# Patient Record
Sex: MALE | Race: BLACK OR AFRICAN AMERICAN | NOT HISPANIC OR LATINO | Employment: FULL TIME | URBAN - METROPOLITAN AREA
[De-identification: names, ages, dates, MRNs, and addresses within clinical notes are randomized per-mention and may not be internally consistent; named-entity substitution may affect disease eponyms.]

---

## 2019-09-23 ENCOUNTER — TRANSCRIBE ORDERS (OUTPATIENT)
Dept: ADMINISTRATIVE | Facility: HOSPITAL | Age: 49
End: 2019-09-23

## 2019-09-23 DIAGNOSIS — D86.9 SARCOIDOSIS: Primary | ICD-10-CM

## 2019-10-17 ENCOUNTER — HOSPITAL ENCOUNTER (OUTPATIENT)
Dept: CT IMAGING | Facility: HOSPITAL | Age: 49
Discharge: HOME | End: 2019-10-17
Admitting: INTERNAL MEDICINE

## 2019-10-17 DIAGNOSIS — D86.9 SARCOIDOSIS: ICD-10-CM

## 2019-10-17 PROCEDURE — 71250 CT THORAX DX C-: CPT

## 2020-02-25 ENCOUNTER — OFFICE VISIT (OUTPATIENT)
Dept: GASTROENTEROLOGY | Facility: CLINIC | Age: 50
End: 2020-02-25

## 2020-02-25 VITALS
DIASTOLIC BLOOD PRESSURE: 82 MMHG | SYSTOLIC BLOOD PRESSURE: 134 MMHG | HEIGHT: 70 IN | BODY MASS INDEX: 34.04 KG/M2 | TEMPERATURE: 98.4 F | WEIGHT: 237.8 LBS

## 2020-02-25 DIAGNOSIS — R11.2 NAUSEA AND VOMITING, INTRACTABILITY OF VOMITING NOT SPECIFIED, UNSPECIFIED VOMITING TYPE: Primary | ICD-10-CM

## 2020-02-25 DIAGNOSIS — Z12.11 ENCOUNTER FOR SCREENING FOR MALIGNANT NEOPLASM OF COLON: ICD-10-CM

## 2020-02-25 DIAGNOSIS — R10.13 DYSPEPSIA: ICD-10-CM

## 2020-02-25 LAB
ALBUMIN SERPL-MCNC: 3.9 G/DL (ref 3.5–5.2)
ALBUMIN/GLOB SERPL: 1.2 G/DL
ALP SERPL-CCNC: 55 U/L (ref 39–117)
ALT SERPL-CCNC: 29 U/L (ref 1–41)
AST SERPL-CCNC: 20 U/L (ref 1–40)
BASOPHILS # BLD AUTO: 0.04 10*3/MM3 (ref 0–0.2)
BASOPHILS NFR BLD AUTO: 0.7 % (ref 0–1.5)
BILIRUB SERPL-MCNC: 0.4 MG/DL (ref 0.2–1.2)
BUN SERPL-MCNC: 14 MG/DL (ref 6–20)
BUN/CREAT SERPL: 12.4 (ref 7–25)
CALCIUM SERPL-MCNC: 8.9 MG/DL (ref 8.6–10.5)
CHLORIDE SERPL-SCNC: 105 MMOL/L (ref 98–107)
CO2 SERPL-SCNC: 25.6 MMOL/L (ref 22–29)
CREAT SERPL-MCNC: 1.13 MG/DL (ref 0.76–1.27)
EOSINOPHIL # BLD AUTO: 0.08 10*3/MM3 (ref 0–0.4)
EOSINOPHIL NFR BLD AUTO: 1.5 % (ref 0.3–6.2)
ERYTHROCYTE [DISTWIDTH] IN BLOOD BY AUTOMATED COUNT: 13.8 % (ref 12.3–15.4)
GLOBULIN SER CALC-MCNC: 3.2 GM/DL
GLUCOSE SERPL-MCNC: 105 MG/DL (ref 65–99)
HCT VFR BLD AUTO: 45.3 % (ref 37.5–51)
HGB BLD-MCNC: 15.4 G/DL (ref 13–17.7)
IMM GRANULOCYTES # BLD AUTO: 0.01 10*3/MM3 (ref 0–0.05)
IMM GRANULOCYTES NFR BLD AUTO: 0.2 % (ref 0–0.5)
LYMPHOCYTES # BLD AUTO: 2.2 10*3/MM3 (ref 0.7–3.1)
LYMPHOCYTES NFR BLD AUTO: 40.9 % (ref 19.6–45.3)
MCH RBC QN AUTO: 30.1 PG (ref 26.6–33)
MCHC RBC AUTO-ENTMCNC: 34 G/DL (ref 31.5–35.7)
MCV RBC AUTO: 88.5 FL (ref 79–97)
MONOCYTES # BLD AUTO: 0.44 10*3/MM3 (ref 0.1–0.9)
MONOCYTES NFR BLD AUTO: 8.2 % (ref 5–12)
NEUTROPHILS # BLD AUTO: 2.61 10*3/MM3 (ref 1.7–7)
NEUTROPHILS NFR BLD AUTO: 48.5 % (ref 42.7–76)
NRBC BLD AUTO-RTO: 0 /100 WBC (ref 0–0.2)
PLATELET # BLD AUTO: 250 10*3/MM3 (ref 140–450)
POTASSIUM SERPL-SCNC: 3.9 MMOL/L (ref 3.5–5.2)
PROT SERPL-MCNC: 7.1 G/DL (ref 6–8.5)
RBC # BLD AUTO: 5.12 10*6/MM3 (ref 4.14–5.8)
SODIUM SERPL-SCNC: 143 MMOL/L (ref 136–145)
WBC # BLD AUTO: 5.38 10*3/MM3 (ref 3.4–10.8)

## 2020-02-25 PROCEDURE — 99204 OFFICE O/P NEW MOD 45 MIN: CPT | Performed by: NURSE PRACTITIONER

## 2020-02-25 RX ORDER — HYDROCODONE BITARTRATE AND ACETAMINOPHEN 5; 325 MG/1; MG/1
1 TABLET ORAL
COMMUNITY
Start: 2016-05-31 | End: 2021-09-21

## 2020-02-25 RX ORDER — NAPROXEN 375 MG/1
TABLET ORAL
COMMUNITY
End: 2021-09-21

## 2020-02-25 RX ORDER — ROSUVASTATIN CALCIUM 40 MG/1
TABLET, COATED ORAL DAILY
COMMUNITY
End: 2021-09-21

## 2020-02-25 RX ORDER — AMLODIPINE BESYLATE 10 MG/1
TABLET ORAL
COMMUNITY
Start: 2019-12-30 | End: 2021-09-21

## 2020-02-25 RX ORDER — PANTOPRAZOLE SODIUM 40 MG/1
40 TABLET, DELAYED RELEASE ORAL 2 TIMES DAILY
Qty: 180 TABLET | Refills: 3 | Status: SHIPPED | OUTPATIENT
Start: 2020-02-25 | End: 2021-04-09

## 2020-02-25 NOTE — PROGRESS NOTES
Chief Complaint   Patient presents with   • Nausea and vomiting   • Dyspepsia     HPI    Chava Gomez is a  49 y.o. male here to establish care as a new patient for complaints of nausea, vomiting, and dyspepsia.  Patient will also follow with Dr. Kirk.  Patient reports ongoing nausea and vomiting for approximately 10 years.  He was diagnosed with sarcoidosis with onset of symptoms.  He reports having an EGD at that time but cannot remember where this was performed or procedural findings.  He has been on omeprazole 20 mg once daily for approximately 1 year (only slight improvement in symptoms with PPI therapy).  Episodes of vomiting occur 3 days out of the week no enticing event.  Frequent dyspepsia symptoms and nausea.  He denies abdominal pain or dysphagia.    Bowels are moving daily with soft stools.  No diarrhea, constipation, or rectal bleeding.  Patient has never had a colonoscopy.    He does not smoke.  He rarely drinks alcohol usually with football games.  He rarely uses NSAIDs.  He still has his gallbladder.  He has never had a gastric emptying study.    Past Medical History:   Diagnosis Date   • GERD (gastroesophageal reflux disease)    • Hyperlipidemia    • Hypertension        History reviewed. No pertinent surgical history.    Scheduled Meds:  Outpatient Encounter Medications as of 2/25/2020   Medication Sig Dispense Refill   • amLODIPine (NORVASC) 10 MG tablet      • rosuvastatin (CRESTOR) 40 MG tablet Take  by mouth Daily.     • [DISCONTINUED] Omeprazole 20 MG Tablet Delayed Release Dispersible Take  by mouth Daily.     • cyclobenzaprine (FLEXERIL) 10 MG tablet Take 1 tablet by mouth 3 (Three) Times a Day As Needed for Muscle Spasms. 42 tablet 0   • HYDROcodone-acetaminophen (NORCO) 5-325 MG per tablet Take 1 tablet by mouth.     • naproxen (NAPROSYN) 375 MG tablet Take  by mouth.     • pantoprazole (PROTONIX) 40 MG EC tablet Take 1 tablet by mouth 2 (Two) Times a Day. 180 tablet 3     No  facility-administered encounter medications on file as of 2/25/2020.        Continuous Infusions:  No current facility-administered medications for this visit.     PRN Meds:.    No Known Allergies    Social History     Socioeconomic History   • Marital status:      Spouse name: Not on file   • Number of children: Not on file   • Years of education: Not on file   • Highest education level: Not on file   Tobacco Use   • Smoking status: Never Smoker   • Smokeless tobacco: Never Used   Substance and Sexual Activity   • Alcohol use: Yes     Comment: social   • Drug use: Not Currently       History reviewed. No pertinent family history.    Review of Systems   Constitutional: Negative for activity change, appetite change, fatigue, fever and unexpected weight change.   HENT: Negative for trouble swallowing and voice change.    Eyes: Negative for discharge and itching.   Respiratory: Negative for apnea, cough, choking, chest tightness, shortness of breath and wheezing.    Cardiovascular: Negative for chest pain, palpitations and leg swelling.   Gastrointestinal: Positive for nausea and vomiting. Negative for abdominal distention, abdominal pain, anal bleeding, blood in stool, constipation, diarrhea and rectal pain.   Endocrine: Negative for cold intolerance and heat intolerance.   Genitourinary: Negative for difficulty urinating and flank pain.   Musculoskeletal: Negative for back pain and neck pain.   Skin: Negative for color change and pallor.   Allergic/Immunologic: Negative for environmental allergies and food allergies.   Neurological: Negative for dizziness and headaches.   Hematological: Negative for adenopathy.   Psychiatric/Behavioral: Negative for agitation and confusion.       Vitals:    02/25/20 0921   BP: 134/82   Temp: 98.4 °F (36.9 °C)       Physical Exam   Constitutional: He is oriented to person, place, and time. He appears well-developed and well-nourished.   HENT:   Head: Normocephalic.   Eyes:  Pupils are equal, round, and reactive to light.   Neck: Normal range of motion.   Cardiovascular: Normal rate, regular rhythm and normal heart sounds.   Pulmonary/Chest: Effort normal and breath sounds normal. No respiratory distress. He has no wheezes.   Abdominal: Soft. Bowel sounds are normal. He exhibits no distension and no mass. There is no tenderness. There is no guarding. No hernia.   Musculoskeletal: Normal range of motion.   Neurological: He is alert and oriented to person, place, and time.   Skin: Skin is warm and dry. Capillary refill takes less than 2 seconds.   Psychiatric: He has a normal mood and affect. His behavior is normal.     No radiology results for the last 7 days    Chava was seen today for nausea and vomiting and dyspepsia.    Diagnoses and all orders for this visit:    Nausea and vomiting, intractability of vomiting not specified, unspecified vomiting type  -     CBC & Differential  -     Comprehensive Metabolic Panel  -     Case Request; Standing  -     Case Request    Dyspepsia  -     CBC & Differential  -     Comprehensive Metabolic Panel  -     Case Request; Standing  -     Case Request    Encounter for screening for malignant neoplasm of colon  -     Case Request; Standing  -     Case Request    Other orders  -     pantoprazole (PROTONIX) 40 MG EC tablet; Take 1 tablet by mouth 2 (Two) Times a Day.    Impression:    Pleasant 49-year-old male seen today to establish care as a new patient for complaints of nausea, vomiting, dyspepsia ongoing for the past decade.  Based on the patient's age he is also due for colon cancer screening.  Moving forward recommend bidirectional endoscopic evaluation with Dr. Kirk for further evaluation of the patient's symptoms.  In the meanwhile stop omeprazole 20 mg once daily and start pantoprazole 40 mg twice daily.  Reinforced the need for antireflux diet, avoidance of alcohol, and avoidance of NSAIDs.      Labs today with CBC and CMP.    Consider  gastric emptying study if EGD found to be unremarkable.    Further recommendations and follow-up pending the aforementioned work-up.

## 2020-03-04 ENCOUNTER — TELEPHONE (OUTPATIENT)
Dept: GASTROENTEROLOGY | Facility: CLINIC | Age: 50
End: 2020-03-04

## 2020-03-04 ENCOUNTER — OUTSIDE FACILITY SERVICE (OUTPATIENT)
Dept: GASTROENTEROLOGY | Facility: CLINIC | Age: 50
End: 2020-03-04

## 2020-03-04 PROCEDURE — 45380 COLONOSCOPY AND BIOPSY: CPT | Performed by: INTERNAL MEDICINE

## 2020-03-04 PROCEDURE — 43239 EGD BIOPSY SINGLE/MULTIPLE: CPT | Performed by: INTERNAL MEDICINE

## 2020-03-04 RX ORDER — PANTOPRAZOLE SODIUM 40 MG/1
40 TABLET, DELAYED RELEASE ORAL 2 TIMES DAILY
Qty: 60 TABLET | Refills: 3 | Status: SHIPPED | OUTPATIENT
Start: 2020-03-04 | End: 2021-04-09

## 2020-03-04 NOTE — TELEPHONE ENCOUNTER
----- Message from Parker Kirk MD sent at 3/4/2020  1:01 PM EST -----  Regarding: ppi  Call in protonix 40mg po BID #[id number] [id number]

## 2020-03-10 NOTE — PROGRESS NOTES
Sarkar's esophagus no dysplasia  Benign polyp of colon  Colonoscopy recall 5 years  EGD recall 1 year  Continue twice daily PPI  Office visit Ana Cristina 6 weeks

## 2020-03-17 ENCOUNTER — TELEPHONE (OUTPATIENT)
Dept: GASTROENTEROLOGY | Facility: CLINIC | Age: 50
End: 2020-03-17

## 2020-03-17 NOTE — TELEPHONE ENCOUNTER
----- Message from AMANDA Avelar sent at 2/26/2020  9:12 AM EST -----  Please notify the patient that his lab work is normal.  No anemia, normal kidney and liver function.

## 2020-03-25 ENCOUNTER — TELEPHONE (OUTPATIENT)
Dept: GASTROENTEROLOGY | Facility: CLINIC | Age: 50
End: 2020-03-25

## 2020-03-25 NOTE — TELEPHONE ENCOUNTER
----- Message from Parker Kirk MD sent at 3/10/2020  3:40 PM EDT -----  Sarkar's esophagus no dysplasia  Benign polyp of colon  Colonoscopy recall 5 years  EGD recall 1 year  Continue twice daily PPI  Office visit Ana Cristina 6 weeks

## 2021-04-02 RX ORDER — NAPROXEN 375 MG/1
TABLET ORAL
Qty: 42 TABLET | Refills: 4 | OUTPATIENT
Start: 2021-04-02

## 2021-04-02 RX ORDER — AMLODIPINE BESYLATE 10 MG/1
TABLET ORAL
Qty: 30 TABLET | Refills: 4 | OUTPATIENT
Start: 2021-04-02

## 2021-04-08 RX ORDER — AMLODIPINE BESYLATE 10 MG/1
TABLET ORAL
Qty: 30 TABLET | Refills: 4 | OUTPATIENT
Start: 2021-04-08

## 2021-04-09 RX ORDER — PANTOPRAZOLE SODIUM 40 MG/1
TABLET, DELAYED RELEASE ORAL
Qty: 180 TABLET | Refills: 0 | Status: SHIPPED | OUTPATIENT
Start: 2021-04-09 | End: 2021-05-17

## 2021-04-12 RX ORDER — OMEPRAZOLE 20 MG/1
CAPSULE, DELAYED RELEASE ORAL
Qty: 30 CAPSULE | Refills: 1 | OUTPATIENT
Start: 2021-04-12

## 2021-05-17 ENCOUNTER — OFFICE VISIT (OUTPATIENT)
Dept: GASTROENTEROLOGY | Facility: CLINIC | Age: 51
End: 2021-05-17

## 2021-05-17 VITALS — BODY MASS INDEX: 35.73 KG/M2 | HEIGHT: 70 IN | WEIGHT: 249.6 LBS

## 2021-05-17 DIAGNOSIS — K22.70 BARRETT'S ESOPHAGUS WITHOUT DYSPLASIA: ICD-10-CM

## 2021-05-17 DIAGNOSIS — R10.13 DYSPEPSIA: ICD-10-CM

## 2021-05-17 DIAGNOSIS — R11.2 NAUSEA AND VOMITING, INTRACTABILITY OF VOMITING NOT SPECIFIED, UNSPECIFIED VOMITING TYPE: Primary | ICD-10-CM

## 2021-05-17 PROCEDURE — 99214 OFFICE O/P EST MOD 30 MIN: CPT | Performed by: NURSE PRACTITIONER

## 2021-05-17 RX ORDER — LANSOPRAZOLE 30 MG/1
30 CAPSULE, DELAYED RELEASE ORAL 2 TIMES DAILY
Qty: 60 CAPSULE | Refills: 5 | Status: SHIPPED | OUTPATIENT
Start: 2021-05-17 | End: 2021-09-21

## 2021-05-17 RX ORDER — SUCRALFATE 1 G/1
1 TABLET ORAL 2 TIMES DAILY
Qty: 60 TABLET | Refills: 3 | Status: SHIPPED | OUTPATIENT
Start: 2021-05-17 | End: 2021-09-21

## 2021-05-17 NOTE — PROGRESS NOTES
Chief Complaint   Patient presents with   • Nausea   • Vomiting   • Heartburn     HPI    Chava Gomez is a  50 y.o. male here for a follow up visit for GERD.  This patient follows with myself and Dr. Kirk. EGD and Cscope 3/2020: Hiatal hernia, esophagus without dysplasia, gastric ulcers, diverticulosis and polyp.  Recall EGD 1 year and recall colonoscopy 5 years.    Today Chava has been struggling with dyspepsia, nausea, and vomiting for at least the past 6 months.  He delayed follow-up due to the onset of the pandemic.  He denies esophageal dysphagia, odynophagia, or early satiety.  Vomiting mainly occurs shortly after eating approximately 3 times a week.  He is currently on Protonix twice daily as instructed following EGD.    No diarrhea, constipation, or rectal bleeding.    Past Medical History:   Diagnosis Date   • GERD (gastroesophageal reflux disease)    • Hyperlipidemia    • Hypertension        History reviewed. No pertinent surgical history.    Scheduled Meds:  Outpatient Encounter Medications as of 5/17/2021   Medication Sig Dispense Refill   • amLODIPine (NORVASC) 10 MG tablet      • naproxen (NAPROSYN) 375 MG tablet Take  by mouth.     • rosuvastatin (CRESTOR) 40 MG tablet Take  by mouth Daily.     • [DISCONTINUED] pantoprazole (PROTONIX) 40 MG EC tablet TAKE 1 TABLET BY MOUTH TWICE DAILY 180 tablet 0   • cyclobenzaprine (FLEXERIL) 10 MG tablet Take 1 tablet by mouth 3 (Three) Times a Day As Needed for Muscle Spasms. 42 tablet 0   • HYDROcodone-acetaminophen (NORCO) 5-325 MG per tablet Take 1 tablet by mouth.     • lansoprazole (PREVACID) 30 MG capsule Take 1 capsule by mouth 2 (Two) Times a Day. 60 capsule 5   • sucralfate (Carafate) 1 g tablet Take 1 tablet by mouth 2 (two) times a day. 60 tablet 3     No facility-administered encounter medications on file as of 5/17/2021.       Continuous Infusions:No current facility-administered medications for this visit.      PRN Meds:.    No Known  Allergies    Social History     Socioeconomic History   • Marital status:      Spouse name: Not on file   • Number of children: Not on file   • Years of education: Not on file   • Highest education level: Not on file   Tobacco Use   • Smoking status: Never Smoker   • Smokeless tobacco: Never Used   Substance and Sexual Activity   • Alcohol use: Yes     Comment: social   • Drug use: Not Currently       History reviewed. No pertinent family history.    Review of Systems   Constitutional: Negative for activity change, appetite change, fatigue, fever and unexpected weight change.   HENT: Negative for trouble swallowing.    Respiratory: Negative for apnea, cough, choking, chest tightness, shortness of breath and wheezing.    Cardiovascular: Negative for chest pain, palpitations and leg swelling.   Gastrointestinal: Positive for nausea and vomiting. Negative for abdominal distention, abdominal pain, anal bleeding, blood in stool, constipation, diarrhea and rectal pain.       There were no vitals filed for this visit.    Physical Exam  Constitutional:       Appearance: He is well-developed.   Abdominal:      General: Bowel sounds are normal. There is no distension.      Palpations: Abdomen is soft. There is no mass.      Tenderness: There is no abdominal tenderness. There is no guarding.      Hernia: No hernia is present.   Musculoskeletal:         General: Normal range of motion.   Skin:     General: Skin is warm and dry.      Capillary Refill: Capillary refill takes less than 2 seconds.   Neurological:      Mental Status: He is alert and oriented to person, place, and time.   Psychiatric:         Behavior: Behavior normal.       No radiology results for the last 7 days    Diagnoses and all orders for this visit:    1. Nausea and vomiting, intractability of vomiting not specified, unspecified vomiting type (Primary)  -     Case Request; Standing  -     Obtain Informed Consent; Standing  -     Case Request    2.  Dyspepsia  -     Case Request; Standing  -     Obtain Informed Consent; Standing  -     Case Request    3. Sarkar's esophagus without dysplasia  -     Case Request; Standing  -     Obtain Informed Consent; Standing  -     Case Request    Other orders  -     lansoprazole (PREVACID) 30 MG capsule; Take 1 capsule by mouth 2 (Two) Times a Day.  Dispense: 60 capsule; Refill: 5  -     sucralfate (Carafate) 1 g tablet; Take 1 tablet by mouth 2 (two) times a day.  Dispense: 60 tablet; Refill: 3    Assessment/plan    Pleasant 50-year-old male seen today in follow-up with symptoms of dyspepsia, nausea and vomiting found to have Sarkar's esophagus last year on EGD.  At this point recommend stopping Protonix and starting Prevacid at twice daily dosing.  He has been on Protonix for a number of years.  Start Carafate in combination with PPI therapy.  Antireflux dietary precautions reinforced.  Educational handout provided to the patient.  Arrange EGD with Dr. Kirk as well to assess response to therapy and mucosal healing.  I counseled the patient on the need to avoid NSAIDs given his history of gastric ulcer.    Colonoscopy for surveillance purposes 2025.    Chava can follow-up back in the office after the aforementioned work-up has been completed.

## 2021-08-12 ENCOUNTER — PREP FOR SURGERY (OUTPATIENT)
Dept: OTHER | Facility: HOSPITAL | Age: 51
End: 2021-08-12

## 2021-08-12 ENCOUNTER — OUTSIDE FACILITY SERVICE (OUTPATIENT)
Dept: GASTROENTEROLOGY | Facility: CLINIC | Age: 51
End: 2021-08-12

## 2021-08-12 DIAGNOSIS — R13.19 OTHER DYSPHAGIA: Primary | ICD-10-CM

## 2021-08-12 PROCEDURE — 43239 EGD BIOPSY SINGLE/MULTIPLE: CPT | Performed by: INTERNAL MEDICINE

## 2021-08-20 ENCOUNTER — TELEPHONE (OUTPATIENT)
Dept: GASTROENTEROLOGY | Facility: CLINIC | Age: 51
End: 2021-08-20

## 2021-08-20 PROBLEM — R13.19 OTHER DYSPHAGIA: Status: ACTIVE | Noted: 2021-08-20

## 2021-08-24 ENCOUNTER — TRANSCRIBE ORDERS (OUTPATIENT)
Dept: SLEEP MEDICINE | Facility: HOSPITAL | Age: 51
End: 2021-08-24

## 2021-08-24 DIAGNOSIS — Z01.818 OTHER SPECIFIED PRE-OPERATIVE EXAMINATION: Primary | ICD-10-CM

## 2021-08-25 ENCOUNTER — TELEPHONE (OUTPATIENT)
Dept: GASTROENTEROLOGY | Facility: CLINIC | Age: 51
End: 2021-08-25

## 2021-08-25 NOTE — TELEPHONE ENCOUNTER
----- Message from Parker Kirk MD sent at 8/20/2021 11:24 AM EDT -----  Sarkar's esophagusEGD recall 5 yearsPlease schedule esophageal manometry

## 2021-08-25 NOTE — TELEPHONE ENCOUNTER
Called pt and advised of Dr. Kirk's note.  Pt verbalized understanding.     EGD placed in recall for 8/12/2026.    Manometry on 8/30/21.

## 2021-08-27 ENCOUNTER — LAB (OUTPATIENT)
Dept: LAB | Facility: HOSPITAL | Age: 51
End: 2021-08-27

## 2021-08-27 DIAGNOSIS — Z01.818 OTHER SPECIFIED PRE-OPERATIVE EXAMINATION: ICD-10-CM

## 2021-08-27 LAB — SARS-COV-2 ORF1AB RESP QL NAA+PROBE: NOT DETECTED

## 2021-08-27 PROCEDURE — C9803 HOPD COVID-19 SPEC COLLECT: HCPCS

## 2021-08-27 PROCEDURE — U0004 COV-19 TEST NON-CDC HGH THRU: HCPCS

## 2021-08-30 ENCOUNTER — HOSPITAL ENCOUNTER (OUTPATIENT)
Facility: HOSPITAL | Age: 51
Setting detail: HOSPITAL OUTPATIENT SURGERY
Discharge: HOME | End: 2021-08-30
Attending: INTERNAL MEDICINE | Admitting: INTERNAL MEDICINE

## 2021-08-30 VITALS
WEIGHT: 239 LBS | HEART RATE: 82 BPM | DIASTOLIC BLOOD PRESSURE: 99 MMHG | RESPIRATION RATE: 16 BRPM | BODY MASS INDEX: 34.22 KG/M2 | SYSTOLIC BLOOD PRESSURE: 144 MMHG | HEIGHT: 70 IN | TEMPERATURE: 98.1 F | OXYGEN SATURATION: 97 %

## 2021-08-30 DIAGNOSIS — R13.19 OTHER DYSPHAGIA: ICD-10-CM

## 2021-08-30 PROCEDURE — S0260 H&P FOR SURGERY: HCPCS | Performed by: INTERNAL MEDICINE

## 2021-08-30 PROCEDURE — 91010 ESOPHAGUS MOTILITY STUDY: CPT

## 2021-08-30 PROCEDURE — 91010 ESOPHAGUS MOTILITY STUDY: CPT | Performed by: INTERNAL MEDICINE

## 2021-09-07 NOTE — PROCEDURES
Procedural Note    Patient Name:  Chava Gomez  YOB: 1970  Medical Records Number:  [id number]    Date of Procedure:  8/30/2021  Procedure Performed: High resolution esophageal motility study    Indication for procedure: GERD, abnormal endoscopy findings  Date of procedure: 8/30/2021  This patient underwent a standard esophageal motility study with the following findings:  LES residual pressure: 8.4 mmHg (less than 15)  LES relaxation percentage: 51% (Greater than 40%)  Esophageal body:  Wave amplitude: 37.3 mmHg ()  Wave duration: 3.8 seconds (2.8-4.1)  Wave velocity: 2.3 cm/s (2.8-6.3)  UES pressure: 2.5 mmHg (less than 12)  Percent failed peristalsis: 30% (0)  Percent peristalsis: 70%  Percent simultaneous contractions 0%     Impression:  Evidence of diminished amplitude and velocity of the esophageal body but not aperistaltic.  Normal LES relaxation and residual pressure with normal UES pressure.  Most consistent with some degree of esophageal dysmotility but not consistent with achalasia.      Pete Martel MD  9/7/2021  17:10 EDT   Spontaneous, unlabored and symmetrical

## 2021-09-07 NOTE — PROCEDURES
Procedural Note    Patient Name:  Chava Gomez  YOB: 1970  Medical Records Number:  [id number]    Date of Procedure:  8/30/2021  Procedure Performed: High resolution esophageal motility study    Indication for procedure: GERD, abnormal endoscopy findings  Date of procedure: 8/30/2021  This patient underwent a standard esophageal motility study with the following findings:  LES residual pressure: 8.4 mmHg (less than 15)  LES relaxation percentage: 51% (Greater than 40%)  Esophageal body:  Wave amplitude: 37.3 mmHg ()  Wave duration: 3.8 seconds (2.8-4.1)  Wave velocity: 2.3 cm/s (2.8-6.3)  UES pressure: 2.5 mmHg (less than 12)  Percent failed peristalsis: 30% (0)  Percent peristalsis: 70%  Percent simultaneous contractions 0%     Impression:  Evidence of diminished amplitude and velocity of the esophageal body but not aperistaltic.  Normal LES relaxation and residual pressure with normal UES pressure.  Most consistent with some degree of esophageal dysmotility but not consistent with achalasia.      Pete Martel MD  9/7/2021  17:27 EDT

## 2021-09-10 ENCOUNTER — TELEPHONE (OUTPATIENT)
Dept: GASTROENTEROLOGY | Facility: CLINIC | Age: 51
End: 2021-09-10

## 2021-09-10 NOTE — TELEPHONE ENCOUNTER
----- Message from Parker Kirk MD sent at 9/10/2021 10:14 AM EDT -----  Impression:  Evidence of diminished amplitude and velocity of the esophageal body but not aperistaltic.  Normal LES relaxation and residual pressure with normal UES pressure.  Most consistent with some degree of esophageal dysmotility but not consistent with achalasia.    Let him know his manometry did not show achalasia just esophageal spasmHe should continue twice daily PPIOffice visit Ana Cristina 4 weeks to discuss further treatment for esophageal spasm

## 2021-09-10 NOTE — TELEPHONE ENCOUNTER
Patient called. Advised as per Dr. Kirk's note. He verb understanding. F/u with Ana Cristina scheduled for 10/13.

## 2021-09-10 NOTE — PROGRESS NOTES
Impression:  Evidence of diminished amplitude and velocity of the esophageal body but not aperistaltic.  Normal LES relaxation and residual pressure with normal UES pressure.  Most consistent with some degree of esophageal dysmotility but not consistent with achalasia.    Let him know his manometry did not show achalasia just esophageal spasmHe should continue twice daily PPIOffice visit Ana Cristina 4 weeks to discuss further treatment for esophageal spasm

## 2021-09-21 ENCOUNTER — OFFICE VISIT (OUTPATIENT)
Dept: FAMILY MEDICINE CLINIC | Facility: CLINIC | Age: 51
End: 2021-09-21

## 2021-09-21 VITALS
BODY MASS INDEX: 34.62 KG/M2 | WEIGHT: 241.8 LBS | HEIGHT: 70 IN | SYSTOLIC BLOOD PRESSURE: 140 MMHG | DIASTOLIC BLOOD PRESSURE: 100 MMHG | RESPIRATION RATE: 16 BRPM

## 2021-09-21 DIAGNOSIS — M10.9 GOUTY ARTHRITIS: ICD-10-CM

## 2021-09-21 DIAGNOSIS — I10 ESSENTIAL HYPERTENSION: ICD-10-CM

## 2021-09-21 DIAGNOSIS — E78.00 PURE HYPERCHOLESTEROLEMIA: ICD-10-CM

## 2021-09-21 DIAGNOSIS — N40.0 BPH WITHOUT URINARY OBSTRUCTION: ICD-10-CM

## 2021-09-21 DIAGNOSIS — M10.9 GOUTY ARTHRITIS: Primary | ICD-10-CM

## 2021-09-21 DIAGNOSIS — I10 ESSENTIAL HYPERTENSION: Primary | ICD-10-CM

## 2021-09-21 PROBLEM — E78.5 HLD (HYPERLIPIDEMIA): Status: ACTIVE | Noted: 2021-09-21

## 2021-09-21 PROBLEM — G47.33 OSA (OBSTRUCTIVE SLEEP APNEA): Status: ACTIVE | Noted: 2021-09-21

## 2021-09-21 PROBLEM — D86.9 SARCOIDOSIS: Status: ACTIVE | Noted: 2021-09-21

## 2021-09-21 PROBLEM — K21.9 GERD (GASTROESOPHAGEAL REFLUX DISEASE): Status: ACTIVE | Noted: 2021-09-21

## 2021-09-21 PROBLEM — E66.9 OBESITY: Status: ACTIVE | Noted: 2021-09-21

## 2021-09-21 PROCEDURE — 99204 OFFICE O/P NEW MOD 45 MIN: CPT | Performed by: INTERNAL MEDICINE

## 2021-09-21 RX ORDER — PANTOPRAZOLE SODIUM 40 MG/1
40 TABLET, DELAYED RELEASE ORAL 2 TIMES DAILY
COMMUNITY

## 2021-09-21 RX ORDER — MULTIPLE VITAMINS W/ MINERALS TAB 9MG-400MCG
TAB ORAL DAILY
COMMUNITY
End: 2021-09-21

## 2021-09-21 RX ORDER — AMLODIPINE BESYLATE 5 MG/1
5 TABLET ORAL DAILY
Qty: 90 TABLET | Refills: 1 | Status: SHIPPED | OUTPATIENT
Start: 2021-09-21 | End: 2021-09-21

## 2021-09-21 RX ORDER — AMLODIPINE BESYLATE 10 MG/1
5 TABLET ORAL DAILY
Qty: 90 TABLET | Refills: 2 | Status: SHIPPED | OUTPATIENT
Start: 2021-09-21 | End: 2021-09-21

## 2021-09-21 RX ORDER — AMLODIPINE BESYLATE 10 MG/1
TABLET ORAL
Qty: 90 TABLET | Refills: 1 | Status: SHIPPED | OUTPATIENT
Start: 2021-09-21
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
## 2021-09-22 LAB
ALBUMIN SERPL-MCNC: 4.4 G/DL (ref 3.5–5.2)
ALBUMIN/GLOB SERPL: 1.7 G/DL
ALP SERPL-CCNC: 59 U/L (ref 39–117)
ALT SERPL-CCNC: 22 U/L (ref 1–41)
AST SERPL-CCNC: 20 U/L (ref 1–40)
BASOPHILS # BLD AUTO: 0.05 10*3/MM3 (ref 0–0.2)
BASOPHILS NFR BLD AUTO: 1 % (ref 0–1.5)
BILIRUB SERPL-MCNC: 0.4 MG/DL (ref 0–1.2)
BUN SERPL-MCNC: 13 MG/DL (ref 6–20)
BUN/CREAT SERPL: 12.1 (ref 7–25)
CALCIUM SERPL-MCNC: 9.2 MG/DL (ref 8.6–10.5)
CHLORIDE SERPL-SCNC: 104 MMOL/L (ref 98–107)
CHOLEST SERPL-MCNC: 245 MG/DL (ref 0–200)
CHOLEST/HDLC SERPL: 6.62 {RATIO}
CO2 SERPL-SCNC: 27.3 MMOL/L (ref 22–29)
CREAT SERPL-MCNC: 1.07 MG/DL (ref 0.76–1.27)
EOSINOPHIL # BLD AUTO: 0.04 10*3/MM3 (ref 0–0.4)
EOSINOPHIL NFR BLD AUTO: 0.8 % (ref 0.3–6.2)
ERYTHROCYTE [DISTWIDTH] IN BLOOD BY AUTOMATED COUNT: 13.8 % (ref 12.3–15.4)
GLOBULIN SER CALC-MCNC: 2.6 GM/DL
GLUCOSE SERPL-MCNC: 96 MG/DL (ref 65–99)
HCT VFR BLD AUTO: 49.2 % (ref 37.5–51)
HCV AB S/CO SERPL IA: <0.1 S/CO RATIO (ref 0–0.9)
HDLC SERPL-MCNC: 37 MG/DL (ref 40–60)
HGB BLD-MCNC: 16 G/DL (ref 13–17.7)
IMM GRANULOCYTES # BLD AUTO: 0.01 10*3/MM3 (ref 0–0.05)
IMM GRANULOCYTES NFR BLD AUTO: 0.2 % (ref 0–0.5)
LDLC SERPL CALC-MCNC: 177 MG/DL (ref 0–100)
LYMPHOCYTES # BLD AUTO: 2.4 10*3/MM3 (ref 0.7–3.1)
LYMPHOCYTES NFR BLD AUTO: 50.2 % (ref 19.6–45.3)
MCH RBC QN AUTO: 29.5 PG (ref 26.6–33)
MCHC RBC AUTO-ENTMCNC: 32.5 G/DL (ref 31.5–35.7)
MCV RBC AUTO: 90.8 FL (ref 79–97)
MONOCYTES # BLD AUTO: 0.53 10*3/MM3 (ref 0.1–0.9)
MONOCYTES NFR BLD AUTO: 11.1 % (ref 5–12)
NEUTROPHILS # BLD AUTO: 1.75 10*3/MM3 (ref 1.7–7)
NEUTROPHILS NFR BLD AUTO: 36.7 % (ref 42.7–76)
NRBC BLD AUTO-RTO: 0 /100 WBC (ref 0–0.2)
PLATELET # BLD AUTO: 259 10*3/MM3 (ref 140–450)
POTASSIUM SERPL-SCNC: 4.8 MMOL/L (ref 3.5–5.2)
PROT SERPL-MCNC: 7 G/DL (ref 6–8.5)
RBC # BLD AUTO: 5.42 10*6/MM3 (ref 4.14–5.8)
SODIUM SERPL-SCNC: 141 MMOL/L (ref 136–145)
TRIGL SERPL-MCNC: 167 MG/DL (ref 0–150)
URATE SERPL-MCNC: 8.9 MG/DL (ref 3.4–7)
VLDLC SERPL CALC-MCNC: 31 MG/DL (ref 5–40)
WBC # BLD AUTO: 4.78 10*3/MM3 (ref 3.4–10.8)

## 2024-11-25 ENCOUNTER — PATIENT ROUNDING (BHMG ONLY) (OUTPATIENT)
Dept: FAMILY MEDICINE CLINIC | Facility: CLINIC | Age: 54
End: 2024-11-25
Payer: COMMERCIAL

## 2024-11-25 ENCOUNTER — OFFICE VISIT (OUTPATIENT)
Dept: FAMILY MEDICINE CLINIC | Facility: CLINIC | Age: 54
End: 2024-11-25
Payer: COMMERCIAL

## 2024-11-25 VITALS
OXYGEN SATURATION: 94 % | SYSTOLIC BLOOD PRESSURE: 180 MMHG | HEART RATE: 83 BPM | WEIGHT: 253 LBS | DIASTOLIC BLOOD PRESSURE: 110 MMHG | HEIGHT: 70 IN | BODY MASS INDEX: 36.22 KG/M2

## 2024-11-25 DIAGNOSIS — I10 PRIMARY HYPERTENSION: Primary | Chronic | ICD-10-CM

## 2024-11-25 DIAGNOSIS — K21.9 CHRONIC GERD: Chronic | ICD-10-CM

## 2024-11-25 DIAGNOSIS — E78.2 MIXED HYPERLIPIDEMIA: Chronic | ICD-10-CM

## 2024-11-25 PROCEDURE — 99204 OFFICE O/P NEW MOD 45 MIN: CPT | Performed by: NURSE PRACTITIONER

## 2024-11-25 RX ORDER — PANTOPRAZOLE SODIUM 40 MG/1
40 TABLET, DELAYED RELEASE ORAL DAILY
Qty: 90 TABLET | Refills: 0 | Status: SHIPPED | OUTPATIENT
Start: 2024-11-25

## 2024-11-25 RX ORDER — AMLODIPINE BESYLATE 5 MG/1
5 TABLET ORAL DAILY
Qty: 30 TABLET | Refills: 0 | Status: SHIPPED | OUTPATIENT
Start: 2024-11-25

## 2024-11-25 NOTE — PATIENT INSTRUCTIONS
Exercising to Stay Healthy  To become healthy and stay healthy, it is recommended that you do moderate-intensity and vigorous-intensity exercise. You can tell that you are exercising at a moderate intensity if your heart starts beating faster and you start breathing faster but can still hold a conversation. You can tell that you are exercising at a vigorous intensity if you are breathing much harder and faster and cannot hold a conversation while exercising.  How can exercise benefit me?  Exercising regularly is important. It has many health benefits, such as:  Improving overall fitness, flexibility, and endurance.  Increasing bone density.  Helping with weight control.  Decreasing body fat.  Increasing muscle strength and endurance.  Reducing stress and tension, anxiety, depression, or anger.  Improving overall health.  What guidelines should I follow while exercising?  Before you start a new exercise program, talk with your health care provider.  Do not exercise so much that you hurt yourself, feel dizzy, or get very short of breath.  Wear comfortable clothes and wear shoes with good support.  Drink plenty of water while you exercise to prevent dehydration or heat stroke.  Work out until your breathing and your heartbeat get faster (moderate intensity).  How often should I exercise?  Choose an activity that you enjoy, and set realistic goals. Your health care provider can help you make an activity plan that is individually designed and works best for you.  Exercise regularly as told by your health care provider. This may include:  Doing strength training two times a week, such as:  Lifting weights.  Using resistance bands.  Push-ups.  Sit-ups.  Yoga.  Doing a certain intensity of exercise for a given amount of time. Choose from these options:  A total of 150 minutes of moderate-intensity exercise every week.  A total of 75 minutes of vigorous-intensity exercise every week.  A mix of moderate-intensity and  vigorous-intensity exercise every week.  Children, pregnant women, people who have not exercised regularly, people who are overweight, and older adults may need to talk with a health care provider about what activities are safe to perform. If you have a medical condition, be sure to talk with your health care provider before you start a new exercise program.  What are some exercise ideas?  Moderate-intensity exercise ideas include:  Walking 1 mile (1.6 km) in about 15 minutes.  Biking.  Hiking.  Golfing.  Dancing.  Water aerobics.  Vigorous-intensity exercise ideas include:  Walking 4.5 miles (7.2 km) or more in about 1 hour.  Jogging or running 5 miles (8 km) in about 1 hour.  Biking 10 miles (16.1 km) or more in about 1 hour.  Lap swimming.  Roller-skating or in-line skating.  Cross-country skiing.  Vigorous competitive sports, such as football, basketball, and soccer.  Jumping rope.  Aerobic dancing.  What are some everyday activities that can help me get exercise?  Yard work, such as:  Pushing a .  Raking and bagging leaves.  Washing your car.  Pushing a stroller.  Shoveling snow.  Gardening.  Washing windows or floors.  How can I be more active in my day-to-day activities?  Use stairs instead of an elevator.  Take a walk during your lunch break.  If you drive, park your car farther away from your work or school.  If you take public transportation, get off one stop early and walk the rest of the way.  Stand up or walk around during all of your indoor phone calls.  Get up, stretch, and walk around every 30 minutes throughout the day.  Enjoy exercise with a friend. Support to continue exercising will help you keep a regular routine of activity.  Where to find more information  You can find more information about exercising to stay healthy from:  [place] Department of Health and Human Services: www.hhs.gov  Centers for Disease Control and Prevention (CDC): www.cdc.gov  Summary  Exercising regularly is  important. It will improve your overall fitness, flexibility, and endurance.  Regular exercise will also improve your overall health. It can help you control your weight, reduce stress, and improve your bone density.  Do not exercise so much that you hurt yourself, feel dizzy, or get very short of breath.  Before you start a new exercise program, talk with your health care provider.  This information is not intended to replace advice given to you by your health care provider. Make sure you discuss any questions you have with your health care provider.  Document Revised: 04/15/2022 Document Reviewed: 04/15/2022  [employer] Patient Education © 2023 [employer] Calorie Counting for Weight Loss  Calories are units of energy. Your body needs a certain number of calories from food to keep going throughout the day. When you eat or drink more calories than your body needs, your body stores the extra calories mostly as fat. When you eat or drink fewer calories than your body needs, your body burns fat to get the energy it needs.  Calorie counting means keeping track of how many calories you eat and drink each day. Calorie counting can be helpful if you need to lose weight. If you eat fewer calories than your body needs, you should lose weight. Ask your health care provider what a healthy weight is for you.  For calorie counting to work, you will need to eat the right number of calories each day to lose a healthy amount of weight per week. A dietitian can help you figure out how many calories you need in a day and will suggest ways to reach your calorie goal.  A healthy amount of weight to lose each week is usually 1-2 lb (0.5-0.9 kg). This usually means that your daily calorie intake should be reduced by 500-750 calories.  Eating 1,200-1,500 calories a day can help most women lose weight.  Eating 1,500-1,800 calories a day can help most men lose weight.  What do I need to know about calorie counting?  Work with your health  care provider or dietitian to determine how many calories you should get each day. To meet your daily calorie goal, you will need to:  Find out how many calories are in each food that you would like to eat. Try to do this before you eat.  Decide how much of the food you plan to eat.  Keep a food log. Do this by writing down what you ate and how many calories it had.  To successfully lose weight, it is important to balance calorie counting with a healthy lifestyle that includes regular activity.  Where do I find calorie information?    The number of calories in a food can be found on a Nutrition Facts label. If a food does not have a Nutrition Facts label, try to look up the calories online or ask your dietitian for help.  Remember that calories are listed per serving. If you choose to have more than one serving of a food, you will have to multiply the calories per serving by the number of servings you plan to eat. For example, the label on a package of bread might say that a serving size is 1 slice and that there are 90 calories in a serving. If you eat 1 slice, you will have eaten 90 calories. If you eat 2 slices, you will have eaten 180 calories.  How do I keep a food log?  After each time that you eat, record the following in your food log as soon as possible:  What you ate. Be sure to include toppings, sauces, and other extras on the food.  How much you ate. This can be measured in cups, ounces, or number of items.  How many calories were in each food and drink.  The total number of calories in the food you ate.  Keep your food log near you, such as in a pocket-sized notebook or on an césar or website on your mobile phone. Some programs will calculate calories for you and show you how many calories you have left to meet your daily goal.  What are some portion-control tips?  Know how many calories are in a serving. This will help you know how many servings you can have of a certain food.  Use a measuring cup to  measure serving sizes. You could also try weighing out portions on a kitchen scale. With time, you will be able to estimate serving sizes for some foods.  Take time to put servings of different foods on your favorite plates or in your favorite bowls and cups so you know what a serving looks like.  Try not to eat straight from a food's packaging, such as from a bag or box. Eating straight from the package makes it hard to see how much you are eating and can lead to overeating. Put the amount you would like to eat in a cup or on a plate to make sure you are eating the right portion.  Use smaller plates, glasses, and bowls for smaller portions and to prevent overeating.  Try not to multitask. For example, avoid watching TV or using your computer while eating. If it is time to eat, sit down at a table and enjoy your food. This will help you recognize when you are full. It will also help you be more mindful of what and how much you are eating.  What are tips for following this plan?  Reading food labels  Check the calorie count compared with the serving size. The serving size may be smaller than what you are used to eating.  Check the source of the calories. Try to choose foods that are high in protein, fiber, and vitamins, and low in saturated fat, trans fat, and sodium.  Shopping  Read nutrition labels while you shop. This will help you make healthy decisions about which foods to buy.  Pay attention to nutrition labels for low-fat or fat-free foods. These foods sometimes have the same number of calories or more calories than the full-fat versions. They also often have added sugar, starch, or salt to make up for flavor that was removed with the fat.  Make a grocery list of lower-calorie foods and stick to it.  Cooking  Try to cook your favorite foods in a healthier way. For example, try baking instead of frying.  Use low-fat dairy products.  Meal planning  Use more fruits and vegetables. One-half of your plate should be  fruits and vegetables.  Include lean proteins, such as chicken, turkey, and fish.  Lifestyle  Each week, aim to do one of the followin minutes of moderate exercise, such as walking.  75 minutes of vigorous exercise, such as running.  General information  Know how many calories are in the foods you eat most often. This will help you calculate calorie counts faster.  Find a way of tracking calories that works for you. Get creative. Try different apps or programs if writing down calories does not work for you.  What foods should I eat?    Eat nutritious foods. It is better to have a nutritious, high-calorie food, such as an avocado, than a food with few nutrients, such as a bag of potato chips.  Use your calories on foods and drinks that will fill you up and will not leave you hungry soon after eating.  Examples of foods that fill you up are nuts and nut butters, vegetables, lean proteins, and high-fiber foods such as whole grains. High-fiber foods are foods with more than 5 g of fiber per serving.  Pay attention to calories in drinks. Low-calorie drinks include water and unsweetened drinks.  The items listed above may not be a complete list of foods and beverages you can eat. Contact a dietitian for more information.  What foods should I limit?  Limit foods or drinks that are not good sources of vitamins, minerals, or protein or that are high in unhealthy fats. These include:  Candy.  Other sweets.  Sodas, specialty coffee drinks, alcohol, and juice.  The items listed above may not be a complete list of foods and beverages you should avoid. Contact a dietitian for more information.  How do I count calories when eating out?  Pay attention to portions. Often, portions are much larger when eating out. Try these tips to keep portions smaller:  Consider sharing a meal instead of getting your own.  If you get your own meal, eat only half of it. Before you start eating, ask for a container and put half of your meal  into it.  When available, consider ordering smaller portions from the menu instead of full portions.  Pay attention to your food and drink choices. Knowing the way food is cooked and what is included with the meal can help you eat fewer calories.  If calories are listed on the menu, choose the lower-calorie options.  Choose dishes that include vegetables, fruits, whole grains, low-fat dairy products, and lean proteins.  Choose items that are boiled, broiled, grilled, or steamed. Avoid items that are buttered, battered, fried, or served with cream sauce. Items labeled as crispy are usually fried, unless stated otherwise.  Choose water, low-fat milk, unsweetened iced tea, or other drinks without added sugar. If you want an alcoholic beverage, choose a lower-calorie option, such as a glass of wine or light beer.  Ask for dressings, sauces, and syrups on the side. These are usually high in calories, so you should limit the amount you eat.  If you want a salad, choose a garden salad and ask for grilled meats. Avoid extra toppings such as plaza, cheese, or fried items. Ask for the dressing on the side, or ask for olive oil and vinegar or lemon to use as dressing.  Estimate how many servings of a food you are given. Knowing serving sizes will help you be aware of how much food you are eating at restaurants.  Where to find more information  Centers for Disease Control and Prevention: www.cdc.gov  [place]. Department of Agriculture: myplate.gov  Summary  Calorie counting means keeping track of how many calories you eat and drink each day. If you eat fewer calories than your body needs, you should lose weight.  A healthy amount of weight to lose per week is usually 1-2 lb (0.5-0.9 kg). This usually means reducing your daily calorie intake by 500-750 calories.  The number of calories in a food can be found on a Nutrition Facts label. If a food does not have a Nutrition Facts label, try to look up the calories online or ask your  dietitian for help.  Use smaller plates, glasses, and bowls for smaller portions and to prevent overeating.  Use your calories on foods and drinks that will fill you up and not leave you hungry shortly after a meal.  This information is not intended to replace advice given to you by your health care provider. Make sure you discuss any questions you have with your health care provider.  Document Revised: 01/28/2021 Document Reviewed: 01/28/2021  Elsevier Patient Education © 2023 [employer]

## 2024-11-25 NOTE — ASSESSMENT & PLAN NOTE
Hypertension is uncontrolled.  Decrease table salt and foods high in salt.  Weight loss.  Increase activity daily.  START Amlodipine 5mg daily (has taken in the past - no SE) for 7 days, if BP still above 140/90, on day 8 and every day thereafter, increase to 5mg - 2 tablets daily every day the same time daily, patient verbalized understanding and agreed.  Check home BP readings and bring log to next appointment.  Blood pressure will be re-assessed in 2 weeks.    Orders:    amLODIPine (NORVASC) 5 MG tablet; Take 1 tablet by mouth Daily.    CBC & Differential    Comprehensive Metabolic Panel

## 2024-11-25 NOTE — PROGRESS NOTES
A [employer] message has been sent to patient rounding with [facility] [place].

## 2024-11-25 NOTE — PROGRESS NOTES
Chief Complaint  Heartburn (Pt wants to establish care as new patient and have some concerns about not being able to keep food down )    Subjective        HPI   History of Present Illness      Chava presents to [facility] to establish care and with complaint of chronic reflux and to have blood pressure checked:    Chronic GERD - he has had GERD for 5-10+ years intermittently.  He reports seeing food particles in reflux that will come up in his mouth.  Sometimes will see blood in the reflux of food.  If he sleeps on left side he is good and does not have reflux.  If he rolls over on right side he will have feeling of an acid burning sensation and and have food particles and acid coming up.  After he refluxes food up he feels relief.    If eat during the day his reflux is better.  When he eats at 6-7pm at night his symptoms worsen.  After he eats at 6-7pm at night, he will sit in recliner and let recliner recline back and will lean onto left side to minimize his reflux symptoms.  If leans on right side food will come up and will also cause burning sensation.  He admits his food will reflux up about 1 hour after eating.  Lately he has been seeing blood come up with food particles.   He will sometimes feel nauseous just before his reflux and will take Pepcid and will sometimes help both nausea and reflux.  He denies fever, chills, diarrhea, constipation, abdominal pain.   He normallly eats, chicken, salmon, steak, pork and will sometimes eat fast food 1-2 times per week.  He normally drinks sweet tea all the time with Propel water sometimes and pinapple rum drink (ETOH only on Thursday) and will have 2 drinks a week on Thursday only.    Hypertension - he has history of having high blood pressure, but when COVID hit it was hard for him to see MD due to COVID pandemic so he never followed up on blood pressure.  He use to take Amlodopine in the past without any problems.  He has BP machine  "at home and is able to check his BP daily at home.  He denies any complaints today.    Hyperlipidemia - he has history of elevated cholesterol in the past.  He use to take Rosuvastatin 40mg in the past.  Will check cholesterol level today and re-evaluate need for cholesterol medication.        Objective   Vital Signs:   Vitals:    11/25/24 1353 11/25/24 1438   BP: Comment: please recheck bp as i got it ranged at 220 (!) 180/110   BP Location:  Left arm   Patient Position:  Sitting   Cuff Size:  Adult   Pulse: 83    SpO2: 94%    Weight: 115 kg (253 lb)    Height: 177.8 cm (70\")             11/25/2024     1:55 PM   PHQ-2/PHQ-9 Depression Screening   Little interest or pleasure in doing things Not at all   Feeling down, depressed, or hopeless Not at all   How difficult have these problems made it for you to do your work, take care of things at home, or get along with other people? Not difficult at all       Class 2 Severe Obesity (BMI >=35 and <=39.9). Obesity-related health conditions include the following: obstructive sleep apnea, hypertension, and GERD. Obesity is newly identified. BMI is is above average; BMI management plan is completed. We discussed low calorie, low carb based diet program, portion control, increasing exercise, and Information on healthy weight added to patient's after visit summary.        Physical Exam  Vitals and nursing note reviewed.   Constitutional:       General: He is not in acute distress.     Appearance: Normal appearance. He is not ill-appearing.   HENT:      Head: Normocephalic and atraumatic.   Neck:      Thyroid: No thyroid mass, thyromegaly or thyroid tenderness.   Cardiovascular:      Rate and Rhythm: Normal rate and regular rhythm.      Heart sounds: Normal heart sounds. No murmur heard.  Pulmonary:      Effort: Pulmonary effort is normal. No respiratory distress.      Breath sounds: Normal breath sounds. No wheezing.   Musculoskeletal:      Right lower leg: No edema.      Left " "lower leg: No edema.   Skin:     General: Skin is warm.   Neurological:      Mental Status: He is alert.          Result Review :                Assessment and Plan    Assessment & Plan  Primary hypertension  Hypertension is uncontrolled.  Decrease table salt and foods high in salt.  Weight loss.  Increase activity daily.  START Amlodipine 5mg daily (has taken in the past - no SE) for 7 days, if BP still above 140/90, on day 8 and every day thereafter, increase to 5mg - 2 tablets daily every day the same time daily, patient verbalized understanding and agreed.  Check home BP readings and bring log to next appointment.  Blood pressure will be re-assessed in 2 weeks.    Orders:    amLODIPine (NORVASC) 5 MG tablet; Take 1 tablet by mouth Daily.    CBC & Differential    Comprehensive Metabolic Panel    Mixed hyperlipidemia  Lipid abnormalities are elevated on prior lab from 9/2021.  He is not taking cholesterol medication.  Encouraged lifestyle changes.  Will re-assess lipids today.    Orders:    Comprehensive Metabolic Panel    Lipid Panel    Chronic GERD  C/O Chronic GERD, refluxing food particles and blood.  Discussed importance of drinking water, decreasing caffeine and ETOH.  Minimize foods that cause reflux symptoms.  Wait 3 hours after eating before sitting in recliner or laying down to sleep.  Exercise daily to help with weight loss.  Rx:  Pantoprazole 40mg daily - take 30 min prior to eating.  Referral to GI for eval/treat and patient agreed.  Orders:    pantoprazole (PROTONIX) 40 MG EC tablet; Take 1 tablet by mouth Daily.    Ambulatory Referral to Gastroenterology    Patient agreed to return for \"fasting\" lab tomorrow.      Follow Up   Return in about 2 weeks (around 12/9/2024) for Follow-up Blood Pressure - new medication & Chronic GERD treatment.  Patient was given instructions and counseling regarding his condition or for health maintenance advice. Please see specific information pulled into the AVS if " appropriate.

## 2024-11-25 NOTE — ASSESSMENT & PLAN NOTE
Lipid abnormalities are elevated on prior lab from 9/2021.  He is not taking cholesterol medication.  Encouraged lifestyle changes.  Will re-assess lipids today.    Orders:    Comprehensive Metabolic Panel    Lipid Panel

## 2024-11-26 ENCOUNTER — TELEPHONE (OUTPATIENT)
Dept: GASTROENTEROLOGY | Facility: CLINIC | Age: 54
End: 2024-11-26
Payer: COMMERCIAL

## 2024-11-26 LAB
ALBUMIN SERPL-MCNC: 3.9 G/DL (ref 3.5–5.2)
ALBUMIN/GLOB SERPL: 1.3 G/DL
ALP SERPL-CCNC: 56 U/L (ref 39–117)
ALT SERPL-CCNC: 22 U/L (ref 1–41)
AST SERPL-CCNC: 18 U/L (ref 1–40)
BASOPHILS # BLD AUTO: 0.03 10*3/MM3 (ref 0–0.2)
BASOPHILS NFR BLD AUTO: 0.5 % (ref 0–1.5)
BILIRUB SERPL-MCNC: 0.5 MG/DL (ref 0–1.2)
BUN SERPL-MCNC: 15 MG/DL (ref 6–20)
BUN/CREAT SERPL: 12.9 (ref 7–25)
CALCIUM SERPL-MCNC: 9.1 MG/DL (ref 8.6–10.5)
CHLORIDE SERPL-SCNC: 106 MMOL/L (ref 98–107)
CHOLEST SERPL-MCNC: 250 MG/DL (ref 0–200)
CO2 SERPL-SCNC: 26.2 MMOL/L (ref 22–29)
CREAT SERPL-MCNC: 1.16 MG/DL (ref 0.76–1.27)
EGFRCR SERPLBLD CKD-EPI 2021: 74.8 ML/MIN/1.73
EOSINOPHIL # BLD AUTO: 0.1 10*3/MM3 (ref 0–0.4)
EOSINOPHIL NFR BLD AUTO: 1.7 % (ref 0.3–6.2)
ERYTHROCYTE [DISTWIDTH] IN BLOOD BY AUTOMATED COUNT: 13.7 % (ref 12.3–15.4)
GLOBULIN SER CALC-MCNC: 3 GM/DL
GLUCOSE SERPL-MCNC: 96 MG/DL (ref 65–99)
HCT VFR BLD AUTO: 45.8 % (ref 37.5–51)
HDLC SERPL-MCNC: 41 MG/DL (ref 40–60)
HGB BLD-MCNC: 14.9 G/DL (ref 13–17.7)
IMM GRANULOCYTES # BLD AUTO: 0.01 10*3/MM3 (ref 0–0.05)
IMM GRANULOCYTES NFR BLD AUTO: 0.2 % (ref 0–0.5)
LDLC SERPL CALC-MCNC: 189 MG/DL (ref 0–100)
LYMPHOCYTES # BLD AUTO: 3.18 10*3/MM3 (ref 0.7–3.1)
LYMPHOCYTES NFR BLD AUTO: 54.5 % (ref 19.6–45.3)
MCH RBC QN AUTO: 29.4 PG (ref 26.6–33)
MCHC RBC AUTO-ENTMCNC: 32.5 G/DL (ref 31.5–35.7)
MCV RBC AUTO: 90.3 FL (ref 79–97)
MONOCYTES # BLD AUTO: 0.59 10*3/MM3 (ref 0.1–0.9)
MONOCYTES NFR BLD AUTO: 10.1 % (ref 5–12)
NEUTROPHILS # BLD AUTO: 1.93 10*3/MM3 (ref 1.7–7)
NEUTROPHILS NFR BLD AUTO: 33 % (ref 42.7–76)
NRBC BLD AUTO-RTO: 0 /100 WBC (ref 0–0.2)
PLATELET # BLD AUTO: 241 10*3/MM3 (ref 140–450)
POTASSIUM SERPL-SCNC: 4.1 MMOL/L (ref 3.5–5.2)
PROT SERPL-MCNC: 6.9 G/DL (ref 6–8.5)
RBC # BLD AUTO: 5.07 10*6/MM3 (ref 4.14–5.8)
SODIUM SERPL-SCNC: 142 MMOL/L (ref 136–145)
TRIGL SERPL-MCNC: 111 MG/DL (ref 0–150)
VLDLC SERPL CALC-MCNC: 20 MG/DL (ref 5–40)
WBC # BLD AUTO: 5.84 10*3/MM3 (ref 3.4–10.8)

## 2024-11-26 NOTE — TELEPHONE ENCOUNTER
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

## 2024-11-26 NOTE — ASSESSMENT & PLAN NOTE
C/O Chronic GERD, refluxing food particles and blood.  Discussed importance of drinking water, decreasing caffeine and ETOH.  Minimize foods that cause reflux symptoms.  Wait 3 hours after eating before sitting in recliner or laying down to sleep.  Exercise daily to help with weight loss.  Rx:  Pantoprazole 40mg daily - take 30 min prior to eating.  Referral to GI for eval/treat and patient agreed.  Orders:    pantoprazole (PROTONIX) 40 MG EC tablet; Take 1 tablet by mouth Daily.    Ambulatory Referral to Gastroenterology

## 2024-11-26 NOTE — TELEPHONE ENCOUNTER
I can work them as an overbook the week of December 9th. Time options: 9:45, 10:45, 1:45. Please do not schedule them on a day that already has 17 patients. Thanks

## 2024-12-04 ENCOUNTER — TELEPHONE (OUTPATIENT)
Dept: FAMILY MEDICINE CLINIC | Facility: CLINIC | Age: 54
End: 2024-12-04
Payer: COMMERCIAL

## 2024-12-04 NOTE — TELEPHONE ENCOUNTER
Hub to relay   Called patient and got voicemail so left a message that I would be sending lab result message through [employer] advised patient to review and despond to lab message in [employer].     Your lab results are as follows:     White blood cells, red blood cells, hemoglobin, hematocrit and platelets are all within normal range.     Cholesterol blood levels are elevated.   Recommend increasing activity 30 minutes per day 5 days per week and decreasing fried/fast/fatty food and decreasing sugar/carb intake.  Also recommend you start taking Atorvastatin 40mg nightly to help decrease your cholesterol and decrease your risk of heart attack and stroke.  The side effects of this medication is muscle aches.  If you begin having muscle aches, please stop taking the medication and call me and let me know so I can check labs (cmp & ck) to confirm whether or not muscle aches are coming from medication.  Please let me know if you agree to take this medication so that I can send it to your pharmacy.  If you agree to take this medication, please also confirm what pharmacy you would like medication sent to.     Otherwise, all other blood levels are essentially within normal limits.     Again, please let me know through [employer] or telephone message if you agree to take the cholesterol medication atorvastatin.  Also confirm what pharmacy you want this medication sent to.     Thank you

## 2024-12-09 ENCOUNTER — OFFICE VISIT (OUTPATIENT)
Dept: FAMILY MEDICINE CLINIC | Facility: CLINIC | Age: 54
End: 2024-12-09
Payer: COMMERCIAL

## 2024-12-09 ENCOUNTER — OFFICE VISIT (OUTPATIENT)
Dept: GASTROENTEROLOGY | Facility: CLINIC | Age: 54
End: 2024-12-09
Payer: COMMERCIAL

## 2024-12-09 VITALS
SYSTOLIC BLOOD PRESSURE: 145 MMHG | DIASTOLIC BLOOD PRESSURE: 104 MMHG | TEMPERATURE: 97.9 F | BODY MASS INDEX: 33.74 KG/M2 | WEIGHT: 241 LBS | HEIGHT: 71 IN | HEART RATE: 80 BPM

## 2024-12-09 VITALS
HEIGHT: 71 IN | DIASTOLIC BLOOD PRESSURE: 104 MMHG | WEIGHT: 241 LBS | SYSTOLIC BLOOD PRESSURE: 150 MMHG | BODY MASS INDEX: 33.74 KG/M2 | HEART RATE: 88 BPM | OXYGEN SATURATION: 95 %

## 2024-12-09 DIAGNOSIS — K21.9 GASTROESOPHAGEAL REFLUX DISEASE, UNSPECIFIED WHETHER ESOPHAGITIS PRESENT: Primary | ICD-10-CM

## 2024-12-09 DIAGNOSIS — K22.70 BARRETT'S ESOPHAGUS WITHOUT DYSPLASIA: ICD-10-CM

## 2024-12-09 DIAGNOSIS — I10 PRIMARY HYPERTENSION: Primary | Chronic | ICD-10-CM

## 2024-12-09 DIAGNOSIS — Z86.0109 PERSONAL HISTORY OF OTHER COLON POLYPS: ICD-10-CM

## 2024-12-09 DIAGNOSIS — E78.2 MIXED HYPERLIPIDEMIA: Chronic | ICD-10-CM

## 2024-12-09 PROCEDURE — 99204 OFFICE O/P NEW MOD 45 MIN: CPT | Performed by: PHYSICIAN ASSISTANT

## 2024-12-09 PROCEDURE — 99214 OFFICE O/P EST MOD 30 MIN: CPT | Performed by: NURSE PRACTITIONER

## 2024-12-09 RX ORDER — ATORVASTATIN CALCIUM 40 MG/1
40 TABLET, FILM COATED ORAL NIGHTLY
Qty: 90 TABLET | Refills: 1 | Status: SHIPPED | OUTPATIENT
Start: 2024-12-09

## 2024-12-09 RX ORDER — AMLODIPINE BESYLATE 10 MG/1
10 TABLET ORAL DAILY
Qty: 90 TABLET | Refills: 1 | Status: SHIPPED | OUTPATIENT
Start: 2024-12-09

## 2024-12-09 NOTE — PROGRESS NOTES
"Chief Complaint  Hypertension (Follow up on bp and medication )    Subjective        HPI   History of Present Illness      Chava presents to [facility] for follow-up on uncontrolled Hypertension, Hyperlipidemia and Chronic GERD:    Hypertension - he reports not getting Amlodpine 5mg daily from pharmacy until 1 week ago which is when he started taking the medication.  He has taken medication for the past 6 days and also took it today at 8-8:30am.    He denies any complaints.    Hyperlipidemia - he use to be on rosuvastatin 40mg nightly and stopped taking it about 3-4 years ago.  Recent labs show elevated cholesterol.  Discussed starting Atorvastatin 40mg nightly and he agreed to start medication tonight.  He denies having any problems when taking statins in the past.            Objective   Vital Signs:   Vitals:    12/09/24 1536 12/09/24 1605   BP: 178/96  Comment: please recheck was unable to hear bottom number (!) 150/104  Comment: re-checked   BP Location: Right arm Right arm   Patient Position: Sitting Sitting   Cuff Size: Adult Adult   Pulse: 88    SpO2: 95%    Weight: 109 kg (241 lb)    Height: 180.3 cm (70.98\")            Physical Exam  Vitals and nursing note reviewed.   Constitutional:       General: He is not in acute distress.     Appearance: Normal appearance. He is not ill-appearing.   HENT:      Head: Normocephalic and atraumatic.   Cardiovascular:      Rate and Rhythm: Normal rate and regular rhythm.      Heart sounds: Normal heart sounds. No murmur heard.  Pulmonary:      Effort: Pulmonary effort is normal. No respiratory distress.      Breath sounds: Normal breath sounds. No wheezing.   Musculoskeletal:      Right lower leg: No edema.      Left lower leg: No edema.   Neurological:      Mental Status: He is alert.          Result Review :     The following data was reviewed by: AMANDA Hopper on 12/09/2024:      Uric Acid (09/21/2021 11:13)  Hepatitis C " Antibody (09/21/2021 11:13)  Comprehensive Metabolic Panel (09/21/2021 11:13)  CBC & Differential (09/21/2021 11:13)  Lipid Panel With / Chol / HDL Ratio (09/21/2021 11:13)     Assessment and Plan    Assessment & Plan  Primary hypertension  Hypertension is uncontrolled.  Decrease table salt and foods high in salt.  Weight loss.  Increase activity daily.  Increase Amlodipine from 5mg to 10mg daily.  Discussed SE of medication.  Blood pressure will be re-assessed in 6 months.    Orders:    atorvastatin (Lipitor) 40 MG tablet; Take 1 tablet by mouth Every Night.    amLODIPine (NORVASC) 10 MG tablet; Take 1 tablet by mouth Daily.    Mixed hyperlipidemia  Lipid abnormalities are elevated on prior labs.  Taking Atorvastatin 40mg daily for about 1 month.  Encouraged lifestyle changes.  Continue current treatment plan.  Will re-assess lipids today.     Orders:    atorvastatin (Lipitor) 40 MG tablet; Take 1 tablet by mouth Every Night.    amLODIPine (NORVASC) 10 MG tablet; Take 1 tablet by mouth Daily.         Follow Up   Return in about 1 month (around 1/9/2025) for Follow-up Blood Pressure - medication change.  Patient was given instructions and counseling regarding his condition or for health maintenance advice. Please see specific information pulled into the AVS if appropriate.

## 2024-12-09 NOTE — ASSESSMENT & PLAN NOTE
Lipid abnormalities are elevated on prior labs.  Taking Atorvastatin 40mg daily for about 1 month.  Encouraged lifestyle changes.  Continue current treatment plan.  Will re-assess lipids today.     Orders:    atorvastatin (Lipitor) 40 MG tablet; Take 1 tablet by mouth Every Night.    amLODIPine (NORVASC) 10 MG tablet; Take 1 tablet by mouth Daily.

## 2024-12-09 NOTE — PROGRESS NOTES
"Chief Complaint  Heartburn    Subjective          History Of Present Illness:    Chava Gomez is a  54 y.o. male patient with a past medical history of hypertension, hyperlipidemia who presents as a new patient for evaluation of GERD.    Patient was previously established with Dr. Kirk and has a known history of Sarkar's esophagus.  Patient underwent extensive workup which showed evidence of hiatal hernia and Sarkar's esophagus.  Patient did have findings of esophageal dysmotility on manometry.  Patient appears to have been off PPI therapy since last seeing us in 2021.    Patient has a long term history of GERD but over the last year he has been experiencing worsening symptoms. He has been experiencing worsening regurgitation. He has noted some dark blood occasionally in the regurgitation. He denies dysphagia or odynophagia. Patient was just started on pantoparzole less than a week ago. No chest pain or abdominal pain. No black or bloody stools.    No gamiky history of colon cancer or gastric cancer.     Additional data reviewed:  Esophageal manometry 8/30/2021 - Evidence of diminished amplitude and velocity of the esophageal body but not aperistaltic.  Normal LES relaxation and residual pressure with normal UES pressure.  Most consistent with some degree of esophageal dysmotility but not consistent with achalasia.   EGD 8/12/2021 with abnormal esophageal motility, mucosal changes spacious for Sarkar's esophagus, 1 cm hiatal hernia, normal stomach and duodenum.  Biopsies consistent with Sarkar's esophagus.  Colonoscopy with normal-appearing TI, 8 mm hyperplastic polyp in the ascending colon, diverticulosis, nonbleeding internal hemorrhoids, otherwise normal.    Objective   Vital Signs:   BP (!) 145/104   Pulse 80   Temp 97.9 °F (36.6 °C)   Ht 180.3 cm (71\")   Wt 109 kg (241 lb)   BMI 33.61 kg/m²       Physical Exam  Vitals reviewed.   Constitutional:       General: He is not in acute distress.     " Appearance: Normal appearance. He is not ill-appearing.   HENT:      Head: Normocephalic and atraumatic.      Nose: Nose normal.      Mouth/Throat:      Pharynx: Oropharynx is clear.   Eyes:      Conjunctiva/sclera: Conjunctivae normal.   Pulmonary:      Effort: Pulmonary effort is normal.   Abdominal:      General: There is no distension.      Palpations: Abdomen is soft. There is no mass.      Tenderness: There is no abdominal tenderness.   Musculoskeletal:         General: No swelling. Normal range of motion.      Cervical back: Normal range of motion.   Skin:     General: Skin is warm and dry.      Findings: No bruising or rash.   Neurological:      General: No focal deficit present.      Mental Status: He is alert and oriented to person, place, and time.      Motor: No weakness.      Gait: Gait normal.   Psychiatric:         Mood and Affect: Mood normal.          Result Review :   The following data was reviewed by: Linh Ford PA-C on 12/09/2024:  CMP          11/26/2024    10:30   CMP   Glucose 96    BUN 15    Creatinine 1.16    Sodium 142    Potassium 4.1    Chloride 106    Calcium 9.1    Total Protein 6.9    Albumin 3.9    Globulin 3.0    Total Bilirubin 0.5    Alkaline Phosphatase 56    AST (SGOT) 18    ALT (SGPT) 22    BUN/Creatinine Ratio 12.9      CBC          11/26/2024    10:30   CBC   WBC 5.84    RBC 5.07    Hemoglobin 14.9    Hematocrit 45.8    MCV 90.3    MCH 29.4    MCHC 32.5    RDW 13.7    Platelets 241            Assessment and Plan    Diagnoses and all orders for this visit:    1. Gastroesophageal reflux disease, unspecified whether esophagitis present (Primary)  -     Case Request; Standing  -     Implement Anesthesia orders day of procedure.; Standing  -     Verify bowel prep was successful; Standing  -     Give tap water enema if bowel prep was insufficient; Standing  -     Case Request    2. Sarkar's esophagus without dysplasia  -     Case Request; Standing  -     Implement  Anesthesia orders day of procedure.; Standing  -     Verify bowel prep was successful; Standing  -     Give tap water enema if bowel prep was insufficient; Standing  -     Case Request    3. Personal history of other colon polyps  -     Case Request; Standing  -     Implement Anesthesia orders day of procedure.; Standing  -     Verify bowel prep was successful; Standing  -     Give tap water enema if bowel prep was insufficient; Standing  -     Case Request       Patient with known history of Sarkar's esophagus and GERD.  Due to the severity of his symptoms I recommend proceeding with updated EGD.  Patient will be due for screening colonoscopy with his history of colon polyps.  Will proceed with colonoscopy at the same time.  Continue pantoprazole 40 mg daily  Antireflux measures and dietary modifications reviewed. Low acid diet reviewed. Keep head of bed elevated. Stop eating/drinking at least 3 hours prior to bedtime. Eliminate caffeine and carbonated beverages.  Weight loss encouraged if BMI over 25.  May need to consider resumption of anticholinergic therapy with history of esophageal dysmotility if he fails to respond to PPI therapy.    Follow Up   Return for EGD/Colonoscopy.    Dragon dictation used throughout this note.            Linh Whaley PA-C   [facility]  [address] [place] [address]  Office: [phone]

## 2024-12-09 NOTE — ASSESSMENT & PLAN NOTE
Hypertension is uncontrolled.  Decrease table salt and foods high in salt.  Weight loss.  Increase activity daily.  Increase Amlodipine from 5mg to 10mg daily.  Discussed SE of medication.  Blood pressure will be re-assessed in 6 months.    Orders:    atorvastatin (Lipitor) 40 MG tablet; Take 1 tablet by mouth Every Night.    amLODIPine (NORVASC) 10 MG tablet; Take 1 tablet by mouth Daily.

## 2025-01-08 ENCOUNTER — OFFICE VISIT (OUTPATIENT)
Dept: FAMILY MEDICINE CLINIC | Facility: CLINIC | Age: 55
End: 2025-01-08
Payer: COMMERCIAL

## 2025-01-08 VITALS
WEIGHT: 246.2 LBS | HEART RATE: 73 BPM | DIASTOLIC BLOOD PRESSURE: 108 MMHG | SYSTOLIC BLOOD PRESSURE: 150 MMHG | OXYGEN SATURATION: 95 % | HEIGHT: 71 IN | BODY MASS INDEX: 34.47 KG/M2

## 2025-01-08 DIAGNOSIS — E78.2 MIXED HYPERLIPIDEMIA: Chronic | ICD-10-CM

## 2025-01-08 DIAGNOSIS — I10 PRIMARY HYPERTENSION: Primary | Chronic | ICD-10-CM

## 2025-01-08 PROCEDURE — 99214 OFFICE O/P EST MOD 30 MIN: CPT | Performed by: NURSE PRACTITIONER

## 2025-01-08 RX ORDER — LOSARTAN POTASSIUM 50 MG/1
50 TABLET ORAL DAILY
Qty: 90 TABLET | Refills: 0 | Status: SHIPPED | OUTPATIENT
Start: 2025-01-08

## 2025-01-08 NOTE — PROGRESS NOTES
"Chief Complaint  Hypertension (Bp check and follow up)    Subjective        HPI   History of Present Illness      Chava presents to [facility] for follow-up on uncontrolled Hypertension and Hyperlipidemia:    Hypertension - he is taking Amlodipine 10mg daily every day.   He denies having CP, SOB, heart palpitations, headache and leg swelling.    Hyperlipidemia - he just started taking Atorvastatin 40mg nightly every night on 12/9/24.  He denies complaints since taking medication.          Objective   Vital Signs:   Vitals:    01/08/25 1156 01/08/25 1212   BP: (!) 138/108  Comment: pt had red bull this morning (!) 150/108  Comment: re-checked   BP Location: Right arm Right arm   Patient Position: Sitting Sitting   Cuff Size: Adult Adult   Pulse: 73    SpO2: 95%    Weight: 112 kg (246 lb 3.2 oz)    Height: 180.3 cm (70.98\")      The 10-year ASCVD risk score (Freeman GONZALEZ, et al., 2019) is: 15.7%    Values used to calculate the score:      Age: 54 years      Sex: Male      Is Non- : Yes      Diabetic: No      Tobacco smoker: No      Systolic Blood Pressure: 150 mmHg      Is BP treated: Yes      HDL Cholesterol: 41 mg/dL      Total Cholesterol: 250 mg/dL          Physical Exam  Vitals and nursing note reviewed.   Constitutional:       General: He is not in acute distress.     Appearance: Normal appearance. He is not ill-appearing.   HENT:      Head: Normocephalic and atraumatic.   Cardiovascular:      Rate and Rhythm: Normal rate and regular rhythm.      Heart sounds: Normal heart sounds. No murmur heard.  Pulmonary:      Effort: Pulmonary effort is normal. No respiratory distress.      Breath sounds: Normal breath sounds. No wheezing.   Musculoskeletal:      Right lower leg: No edema.      Left lower leg: No edema.   Neurological:      Mental Status: He is alert.          Result Review :     The following data was reviewed by: AMANDA Hopper on " 01/08/2025:     Comprehensive Metabolic Panel (11/26/2024 10:30)   Lipid Panel (11/26/2024 10:30)      Assessment and Plan    Assessment & Plan  Primary hypertension  Hypertension is uncontrolled on Amlodipine 10mg daily.  Decrease table salt and foods high in salt.  Weight loss.  Increase activity daily.  Continue Amlodipine 10mg daily.  Start Losartan 50mg daily.  Discussed SE of med.  Blood pressure will be re-assessed in 2 weeks.    Orders:    losartan (Cozaar) 50 MG tablet; Take 1 tablet by mouth Daily.    Mixed hyperlipidemia  Lipid abnormalities are uncontrolled.  Just start taking Atorvastatin 40 mg nightly.  Encouraged lifestyle changes.  Continue current treatment plan.  Will continue to monitor lipids.         Follow Up   Return in about 2 weeks (around 1/22/2025) for Next scheduled follow up - uncontrolled HTN.  Patient was given instructions and counseling regarding his condition or for health maintenance advice. Please see specific information pulled into the AVS if appropriate.

## 2025-01-08 NOTE — ASSESSMENT & PLAN NOTE
Hypertension is uncontrolled on Amlodipine 10mg daily.  Decrease table salt and foods high in salt.  Weight loss.  Increase activity daily.  Continue Amlodipine 10mg daily.  Start Losartan 50mg daily.  Discussed SE of med.  Blood pressure will be re-assessed in 2 weeks.    Orders:    losartan (Cozaar) 50 MG tablet; Take 1 tablet by mouth Daily.

## 2025-01-09 NOTE — ASSESSMENT & PLAN NOTE
Lipid abnormalities are uncontrolled.  Just start taking Atorvastatin 40 mg nightly.  Encouraged lifestyle changes.  Continue current treatment plan.  Will continue to monitor lipids.

## 2025-01-21 DIAGNOSIS — I10 PRIMARY HYPERTENSION: Chronic | ICD-10-CM

## 2025-01-21 DIAGNOSIS — E78.2 MIXED HYPERLIPIDEMIA: Chronic | ICD-10-CM

## 2025-01-22 ENCOUNTER — OFFICE VISIT (OUTPATIENT)
Dept: FAMILY MEDICINE CLINIC | Facility: CLINIC | Age: 55
End: 2025-01-22
Payer: COMMERCIAL

## 2025-01-22 VITALS
HEART RATE: 79 BPM | OXYGEN SATURATION: 95 % | SYSTOLIC BLOOD PRESSURE: 144 MMHG | HEIGHT: 71 IN | WEIGHT: 244.8 LBS | DIASTOLIC BLOOD PRESSURE: 96 MMHG | BODY MASS INDEX: 34.27 KG/M2

## 2025-01-22 DIAGNOSIS — Z23 FLU VACCINE NEED: ICD-10-CM

## 2025-01-22 DIAGNOSIS — I10 PRIMARY HYPERTENSION: Primary | Chronic | ICD-10-CM

## 2025-01-22 DIAGNOSIS — E78.2 MIXED HYPERLIPIDEMIA: Chronic | ICD-10-CM

## 2025-01-22 DIAGNOSIS — Z91.199 CURRENT NON-ADHERENCE TO MEDICAL TREATMENT: ICD-10-CM

## 2025-01-22 DIAGNOSIS — Z12.11 SCREENING FOR COLON CANCER: ICD-10-CM

## 2025-01-22 RX ORDER — AMLODIPINE BESYLATE 10 MG/1
10 TABLET ORAL DAILY
Qty: 90 TABLET | Refills: 1 | OUTPATIENT
Start: 2025-01-22

## 2025-01-22 NOTE — PROGRESS NOTES
"Chief Complaint  Hypertension (2 week follow up)    Subjective        HPI   History of Present Illness      Chava presents to [facility] for follow-up on uncontrolled Hypertension and Hyperlipidemia:    Hypertension - he is on Amlodipine 10mg daily and Losartan 50mg daily.  He started the increased dose of Amlodipine 10mg daily about 6-7 days ago. He has picked up the Losartan 50mg tablet but has not started taking it yet.   He denies having any complaints.    Hyperlipidemia - he was prescribed Atorvastatin 40mg nightly on 12/9/24.  He started taking this cholesterol medication about 3 weeks ago.  He denies myalgia.    Colon cancer screening - discussed colon cancer screening is due in 3/2025.  Followed by Dr. Kirk, GI specialist.  He has appointment scheduled for 2/17/25 and encouraged to attend this appointment.    Vaccines - discussed importance of getting COVID or Influenza vaccine today and patient agreed to get Influenza vaccine today.          Objective   Vital Signs:   Vitals:    01/22/25 1148 01/22/25 1229   BP: Comment: unable to get bp please recheck 144/96  Comment: re-checked   BP Location:  Right arm   Patient Position:  Sitting   Cuff Size:  Adult   Pulse: 79    SpO2: 95%    Weight: 111 kg (244 lb 12.8 oz)    Height: 180.3 cm (70.98\")              Physical Exam  Vitals and nursing note reviewed.   Constitutional:       General: He is not in acute distress.     Appearance: Normal appearance. He is not ill-appearing.   HENT:      Head: Normocephalic and atraumatic.   Cardiovascular:      Rate and Rhythm: Normal rate and regular rhythm.      Heart sounds: Normal heart sounds. No murmur heard.  Pulmonary:      Effort: Pulmonary effort is normal. No respiratory distress.      Breath sounds: Normal breath sounds. No wheezing.   Musculoskeletal:      Right lower leg: No edema.      Left lower leg: No edema.   Neurological:      Mental Status: He is alert.          Result " Review :     The following data was reviewed by: AMANDA Hopper on 01/22/2025:      Lipid Panel (11/26/2024 10:30)  Comprehensive Metabolic Panel (11/26/2024 10:30)  CBC & Differential (11/26/2024 10:30)       Assessment and Plan    Assessment & Plan  Primary hypertension  Hypertension is uncontrolled on Amlodipine 10mg daily (just started increased dose 6-7 days ago, prescribed on 12/9/25).  He has not started Losartan 50mg daily (prescribed on 1/8/25).  Decrease table salt and foods high in salt.  Weight loss.  Increase activity daily.  Continue Amlodipine 10mg daily and start Losartan 50mg daily - advised to take Losartan 1/2 tab for 5 days and then increase to 1 tablet there after and he agreed.  Blood pressure will be re-assessed in 1 month.         Mixed hyperlipidemia  Lipid abnormalities are uncontrolled on prior lab.  Prescribed Atorvastatin 40mg nightly on 12/9/24, just started taking it about 3 weeks ago.  Encouraged lifestyle changes.  Continue current treatment plan.  Will re-assess lipids today.         Screening for colon cancer  Discussed that colon cancer screening is due in 3/2025.  Patient is followed by Dr. Kirk, GI specialist.  He has appointment scheduled for 2/17/25.  Encouraged patient to attend this appointment and discuss scheduling colon cancer screening and he agreed.      Current non-adherence to medical treatment  Currently non-adherent to taking blood pressure and cholesterol medications as previously discussed/prescribed.       Flu vaccine need  Patient agreed to get Influenza vaccine today.  Orders:    Fluzone >6mos ([id number])         Follow Up   Return in about 1 month (around 2/22/2025) for Next scheduled follow up - Uncontrolled HTN.  Patient was given instructions and counseling regarding his condition or for health maintenance advice. Please see specific information pulled into the AVS if appropriate.

## 2025-01-22 NOTE — ASSESSMENT & PLAN NOTE
Lipid abnormalities are uncontrolled on prior lab.  Prescribed Atorvastatin 40mg nightly on 12/9/24, just started taking it about 3 weeks ago.  Encouraged lifestyle changes.  Continue current treatment plan.  Will re-assess lipids today.

## 2025-01-22 NOTE — ASSESSMENT & PLAN NOTE
Hypertension is uncontrolled on Amlodipine 10mg daily (just started increased dose 6-7 days ago, prescribed on 12/9/25).  He has not started Losartan 50mg daily (prescribed on 1/8/25).  Decrease table salt and foods high in salt.  Weight loss.  Increase activity daily.  Continue Amlodipine 10mg daily and start Losartan 50mg daily - advised to take Losartan 1/2 tab for 5 days and then increase to 1 tablet there after and he agreed.  Blood pressure will be re-assessed in 1 month.

## 2025-01-26 PROBLEM — Z91.199 CURRENT NON-ADHERENCE TO MEDICAL TREATMENT: Status: ACTIVE | Noted: 2025-01-26

## 2025-01-27 NOTE — ASSESSMENT & PLAN NOTE
Currently non-adherent to taking blood pressure and cholesterol medications as previously discussed/prescribed.

## 2025-02-17 ENCOUNTER — OUTSIDE FACILITY SERVICE (OUTPATIENT)
Dept: GASTROENTEROLOGY | Facility: CLINIC | Age: 55
End: 2025-02-17
Payer: COMMERCIAL

## 2025-02-17 ENCOUNTER — TELEPHONE (OUTPATIENT)
Dept: GASTROENTEROLOGY | Facility: CLINIC | Age: 55
End: 2025-02-17

## 2025-02-17 ENCOUNTER — LAB REQUISITION (OUTPATIENT)
Dept: LAB | Facility: HOSPITAL | Age: 55
End: 2025-02-17
Payer: COMMERCIAL

## 2025-02-17 DIAGNOSIS — R12 HEARTBURN: ICD-10-CM

## 2025-02-17 DIAGNOSIS — K22.89 OTHER SPECIFIED DISEASE OF ESOPHAGUS: ICD-10-CM

## 2025-02-17 DIAGNOSIS — K22.70 BARRETT'S ESOPHAGUS WITHOUT DYSPLASIA: ICD-10-CM

## 2025-02-17 DIAGNOSIS — K44.9 DIAPHRAGMATIC HERNIA WITHOUT OBSTRUCTION OR GANGRENE: ICD-10-CM

## 2025-02-17 PROCEDURE — 43239 EGD BIOPSY SINGLE/MULTIPLE: CPT | Performed by: INTERNAL MEDICINE

## 2025-02-17 PROCEDURE — 88305 TISSUE EXAM BY PATHOLOGIST: CPT | Performed by: INTERNAL MEDICINE

## 2025-02-17 PROCEDURE — 45378 DIAGNOSTIC COLONOSCOPY: CPT | Performed by: INTERNAL MEDICINE

## 2025-02-17 NOTE — TELEPHONE ENCOUNTER
PT IS GOING TO [facility] AND THEY HAVE THE RIGHTS TO THE ARRIVE TIME THE BEST NUMBER FOR THEM TO CALL .[phone]    OK FOR THE HUB TO RELAY AND LVM

## 2025-02-17 NOTE — TELEPHONE ENCOUNTER
Hub staff attempted to follow warm transfer process and was unsuccessful     Caller: Chava Gomez    Relationship to patient: Self    Best call back number: [phone]    Patient is needing: PLEASE GIVE PT A CALL BACK ASAP. PT SAID HE IS GOING TO BE 10 MINS LATE FOR HIS APPT THAT STARTS 11:00AM. IN HIS CHART HIS EGD START TIME SAYS 10:00AM. I DO NOT KNOW IF IT HAS BEEN CHANGED TO 11:00AM CAUSE IT IS NOT SHOWING. PT IS ON HIS WAY TO THE PROCEDURE. IF NOT ABLE TO REACH PT. IT IS OKAY TO LVM.

## 2025-02-18 LAB
CYTO UR: NORMAL
LAB AP CASE REPORT: NORMAL
LAB AP CLINICAL INFORMATION: NORMAL
PATH REPORT.FINAL DX SPEC: NORMAL
PATH REPORT.GROSS SPEC: NORMAL

## 2025-02-19 ENCOUNTER — TELEPHONE (OUTPATIENT)
Dept: FAMILY MEDICINE CLINIC | Facility: CLINIC | Age: 55
End: 2025-02-19

## 2025-02-19 NOTE — TELEPHONE ENCOUNTER
Hub to relay and schedule   Called to reschedule appt could not leave a message but left a SMS to see if patient call back need to reschedule appointment provider is out of office

## 2025-02-26 ENCOUNTER — OFFICE VISIT (OUTPATIENT)
Dept: FAMILY MEDICINE CLINIC | Facility: CLINIC | Age: 55
End: 2025-02-26
Payer: COMMERCIAL

## 2025-02-26 VITALS
SYSTOLIC BLOOD PRESSURE: 144 MMHG | HEIGHT: 71 IN | BODY MASS INDEX: 34.35 KG/M2 | WEIGHT: 245.4 LBS | HEART RATE: 79 BPM | DIASTOLIC BLOOD PRESSURE: 110 MMHG | OXYGEN SATURATION: 94 %

## 2025-02-26 DIAGNOSIS — I10 PRIMARY HYPERTENSION: Primary | ICD-10-CM

## 2025-02-26 PROCEDURE — 99214 OFFICE O/P EST MOD 30 MIN: CPT | Performed by: NURSE PRACTITIONER

## 2025-02-26 NOTE — PROGRESS NOTES
"Chief Complaint  Hypertension (1 month follow up )    Subjective        HPI   History of Present Illness      Chava presents to [facility] for follow-up on uncontrolled Hypertension:     Hypertension - he is on Amlodipine 10mg daily and Losartan 50mg daily.   He has been taking Amlodipine 10mg - 0.5 tablet and full tablet of Losartan 50mg daily.  Patient showed me a picture on his phone showing wrist cuff picture of BP reading of 152/78 when sitting in car with arm on door.  Advised to buy upper arm BP cuff and check home BP until next visit.  He denies having any complaints.           Objective   Vital Signs:   Vitals:    02/26/25 0818 02/26/25 0838   BP: (!) 148/118  Comment: pt just took medication at 7:30 (!) 144/110  Comment: re-check   BP Location: Left arm Left arm   Patient Position: Sitting Sitting   Cuff Size: Adult Adult   Pulse: 79    SpO2: 94%    Weight: 111 kg (245 lb 6.4 oz)    Height: 180.3 cm (70.98\")            Physical Exam  Vitals and nursing note reviewed.   Constitutional:       General: He is not in acute distress.     Appearance: Normal appearance. He is not ill-appearing.   HENT:      Head: Normocephalic and atraumatic.   Cardiovascular:      Rate and Rhythm: Normal rate and regular rhythm.      Heart sounds: Normal heart sounds. No murmur heard.  Pulmonary:      Effort: Pulmonary effort is normal. No respiratory distress.      Breath sounds: Normal breath sounds. No wheezing.   Musculoskeletal:      Right lower leg: No edema.      Left lower leg: No edema.   Neurological:      Mental Status: He is alert.          Result Review :     The following data was reviewed by: AMANDA Hopper on 02/26/2025:      Comprehensive Metabolic Panel (11/26/2024 10:30)      Assessment and Plan    Assessment & Plan  Primary hypertension  Hypertension is uncontrolled due to only taking Amlodipine 10mg - 0.5 tablet.  Decrease table salt and foods high in salt.  Weight " loss.  Increase activity daily.  Continue Losartan 50mg daily and take Amlodipine 10mg (1 tablet) daily.  Advised to buy upper arm BP cuff and recommended he check his home BP daily, record on log and bring log and home BP machine to next appointment and patient agreed.  Blood pressure will be re-assessed in 1 week.    Orders:    Basic Metabolic Panel         Follow Up   Return in about 1 week (around 3/5/2025) for Next scheduled follow up - uncontrolled HTN.  Patient was given instructions and counseling regarding his condition or for health maintenance advice. Please see specific information pulled into the AVS if appropriate.

## 2025-02-26 NOTE — ASSESSMENT & PLAN NOTE
Hypertension is uncontrolled due to only taking Amlodipine 10mg - 0.5 tablet.  Decrease table salt and foods high in salt.  Weight loss.  Increase activity daily.  Continue Losartan 50mg daily and take Amlodipine 10mg (1 tablet) daily.  Advised to buy upper arm BP cuff and recommended he check his home BP daily, record on log and bring log and home BP machine to next appointment and patient agreed.  Blood pressure will be re-assessed in 1 week.    Orders:    Basic Metabolic Panel

## 2025-03-04 ENCOUNTER — TELEPHONE (OUTPATIENT)
Dept: GASTROENTEROLOGY | Facility: CLINIC | Age: 55
End: 2025-03-04
Payer: COMMERCIAL

## 2025-03-04 NOTE — TELEPHONE ENCOUNTER
Hub staff attempted to follow warm transfer process and was unsuccessful     Caller: Chava Gomez    Relationship to patient: Self    Best call back number: [phone]    Patient is needing: PT REQUESTING A CALL BACK IN REGARDS TO RESULTS FROM C-SCOPE & EGD.

## 2025-03-04 NOTE — TELEPHONE ENCOUNTER
Patient called. He states he has not read his my chart update. Advised as per Dr. Kirk's note. He verb understanding.

## 2025-03-05 ENCOUNTER — OFFICE VISIT (OUTPATIENT)
Dept: FAMILY MEDICINE CLINIC | Facility: CLINIC | Age: 55
End: 2025-03-05
Payer: COMMERCIAL

## 2025-03-05 VITALS
DIASTOLIC BLOOD PRESSURE: 104 MMHG | BODY MASS INDEX: 33.77 KG/M2 | OXYGEN SATURATION: 96 % | SYSTOLIC BLOOD PRESSURE: 146 MMHG | HEART RATE: 82 BPM | WEIGHT: 242 LBS

## 2025-03-05 DIAGNOSIS — I10 PRIMARY HYPERTENSION: Primary | ICD-10-CM

## 2025-03-05 PROCEDURE — 99214 OFFICE O/P EST MOD 30 MIN: CPT | Performed by: NURSE PRACTITIONER

## 2025-03-05 RX ORDER — LOSARTAN POTASSIUM 100 MG/1
100 TABLET ORAL DAILY
Qty: 90 TABLET | Refills: 1 | Status: SHIPPED | OUTPATIENT
Start: 2025-03-05

## 2025-03-05 NOTE — PROGRESS NOTES
Chief Complaint  Hypertension (Need covid zoster physical pneumococcal )    Subjective        HPI     Chava presents to [facility] for folllow-up on uncontrolled Hypertension:    Hypertension - he is on Amlodipine 10mg daily and Losartan 50mg daily.  He checked his home BP with an arm BP cuff and his BP was 126/98 at 7am and took his BP medication at 8am.   He denies having any complaints.         Objective   Vital Signs:   Vitals:    03/05/25 1047 03/05/25 1144   BP: 158/100 (!) 146/104  Comment: re-checked   BP Location: Left arm Left arm   Patient Position: Sitting Sitting   Cuff Size: Adult Adult   Pulse: 82    SpO2: 96%    Weight: 110 kg (242 lb)           Physical Exam  Constitutional:       General: He is not in acute distress.     Appearance: Normal appearance. He is not ill-appearing.   HENT:      Head: Normocephalic and atraumatic.   Eyes:      Conjunctiva/sclera: Conjunctivae normal.   Cardiovascular:      Rate and Rhythm: Normal rate and regular rhythm.      Heart sounds: Normal heart sounds. No murmur heard.  Pulmonary:      Effort: Pulmonary effort is normal. No respiratory distress.      Breath sounds: Normal breath sounds. No wheezing.   Musculoskeletal:      Right lower leg: No edema.      Left lower leg: No edema.   Neurological:      Mental Status: He is alert.          Result Review :     The following data was reviewed by: AMANDA Hopper on 03/05/2025:      Comprehensive Metabolic Panel (11/26/2024 10:30)      Assessment and Plan    Assessment & Plan  Primary hypertension  Hypertension is uncontrolled on Losartan 50mg daily.  Decrease table salt and foods high in salt.  Weight loss.  Increase activity daily.  Avoid/minimize caffeine and ETOH.  Continue Amlodipine 10mg daily.  Increase Losartan 50mg daily to 100mg daily.  Continue checking home BP with upper arm cuff daily, record on log and bring log to next visit and he agreed.  Blood pressure will be  re-assessed in 2 weeks.    Orders:    losartan (COZAAR) 100 MG tablet; Take 1 tablet by mouth Daily.         Follow Up   Return in about 2 weeks (around 3/19/2025) for Follow-up Blood Pressure - medication change.  Patient was given instructions and counseling regarding his condition or for health maintenance advice. Please see specific information pulled into the AVS if appropriate.

## 2025-03-05 NOTE — ASSESSMENT & PLAN NOTE
Hypertension is uncontrolled on Losartan 50mg daily.  Decrease table salt and foods high in salt.  Weight loss.  Increase activity daily.  Avoid/minimize caffeine and ETOH.  Continue Amlodipine 10mg daily.  Increase Losartan 50mg daily to 100mg daily.  Continue checking home BP with upper arm cuff daily, record on log and bring log to next visit and he agreed.  Blood pressure will be re-assessed in 2 weeks.    Orders:    losartan (COZAAR) 100 MG tablet; Take 1 tablet by mouth Daily.

## 2025-03-19 ENCOUNTER — OFFICE VISIT (OUTPATIENT)
Dept: FAMILY MEDICINE CLINIC | Facility: CLINIC | Age: 55
End: 2025-03-19
Payer: COMMERCIAL

## 2025-03-19 VITALS
HEART RATE: 80 BPM | HEIGHT: 71 IN | DIASTOLIC BLOOD PRESSURE: 114 MMHG | OXYGEN SATURATION: 94 % | WEIGHT: 247 LBS | BODY MASS INDEX: 34.58 KG/M2 | SYSTOLIC BLOOD PRESSURE: 154 MMHG

## 2025-03-19 DIAGNOSIS — K22.70 BARRETT'S ESOPHAGUS WITHOUT DYSPLASIA: ICD-10-CM

## 2025-03-19 DIAGNOSIS — I10 PRIMARY HYPERTENSION: Primary | ICD-10-CM

## 2025-03-19 DIAGNOSIS — K21.9 CHRONIC GERD: Chronic | ICD-10-CM

## 2025-03-19 PROCEDURE — 99214 OFFICE O/P EST MOD 30 MIN: CPT | Performed by: NURSE PRACTITIONER

## 2025-03-19 RX ORDER — PANTOPRAZOLE SODIUM 40 MG/1
40 TABLET, DELAYED RELEASE ORAL DAILY
Qty: 90 TABLET | Refills: 1 | Status: SHIPPED | OUTPATIENT
Start: 2025-03-19

## 2025-03-19 NOTE — ASSESSMENT & PLAN NOTE
Hypertension is uncontrolled due to stopping Amlodipine since last visit.  Decrease table salt and foods high in salt.  Weight loss.  Increase activity daily.  Re-start Amlodipine 10mg daily.  Continue Losartan 100mg daily.  Blood pressure will be re-assessed in 1 month.

## 2025-03-19 NOTE — PROGRESS NOTES
"Chief Complaint  Hypertension (Reorder protonix per pt)    Subjective        HPI     Chava presents to [facility] for uncontrolled Hypertension:    Hypertension - he is on Amlodipine 10mg daily and Losartan 100mg daily.  He stopped taking Amlodipine last visit.  He thought he was suppose to stop Amlodipine and only take Losartan daily.  He denies having any complaints.     Patient has Chronic GERD and recent diagnosis of Sarkar's Esophagus and is requesting refill of Pantoprazole 40mg daily.  Patient is aware of his 6 month follow-up with GI.             Objective   Vital Signs:   Vitals:    03/19/25 1134 03/19/25 1159   BP:  (!) 154/114   BP Location:  Left arm   Patient Position:  Sitting   Cuff Size:  Adult   Pulse: 80    SpO2: 94%    Weight: 112 kg (247 lb)    Height: 180.3 cm (70.98\")            Physical Exam  Vitals and nursing note reviewed.   Constitutional:       General: He is not in acute distress.     Appearance: Normal appearance. He is not ill-appearing.   HENT:      Head: Normocephalic and atraumatic.   Cardiovascular:      Rate and Rhythm: Normal rate and regular rhythm.      Heart sounds: Normal heart sounds. No murmur heard.  Pulmonary:      Effort: Pulmonary effort is normal. No respiratory distress.      Breath sounds: Normal breath sounds. No wheezing or rhonchi.   Musculoskeletal:      Right lower leg: No edema.      Left lower leg: No edema.   Neurological:      Mental Status: He is alert.          Result Review :     The following data was reviewed by: AMANDA Hopper on 03/19/2025:      Comprehensive Metabolic Panel (11/26/2024 10:30)      Assessment and Plan    Assessment & Plan  Primary hypertension  Hypertension is uncontrolled due to stopping Amlodipine since last visit.  Decrease table salt and foods high in salt.  Weight loss.  Increase activity daily.  Re-start Amlodipine 10mg daily.  Continue Losartan 100mg daily.  Blood pressure will be " re-assessed in 1 month.         Chronic GERD  Encouraged to follow-up with GI and patient agreed.    Orders:    pantoprazole (PROTONIX) 40 MG EC tablet; Take 1 tablet by mouth Daily.    Sarkar's esophagus without dysplasia  Encouraged to follow-up with GI and patient agreed.    Orders:    pantoprazole (PROTONIX) 40 MG EC tablet; Take 1 tablet by mouth Daily.         Follow Up   Return in about 1 month (around 4/19/2025) for Follow-up Blood Pressure - medication change.  Patient was given instructions and counseling regarding his condition or for health maintenance advice. Please see specific information pulled into the AVS if appropriate.

## 2025-03-19 NOTE — ASSESSMENT & PLAN NOTE
Encouraged to follow-up with GI and patient agreed.    Orders:    pantoprazole (PROTONIX) 40 MG EC tablet; Take 1 tablet by mouth Daily.

## 2025-03-26 ENCOUNTER — TELEPHONE (OUTPATIENT)
Dept: GASTROENTEROLOGY | Facility: CLINIC | Age: 55
End: 2025-03-26
Payer: COMMERCIAL

## 2025-03-26 DIAGNOSIS — K22.70 BARRETT'S ESOPHAGUS WITHOUT DYSPLASIA: ICD-10-CM

## 2025-03-26 DIAGNOSIS — K21.9 CHRONIC GERD: Chronic | ICD-10-CM

## 2025-03-26 RX ORDER — PANTOPRAZOLE SODIUM 40 MG/1
40 TABLET, DELAYED RELEASE ORAL DAILY
Qty: 90 TABLET | Refills: 3 | Status: SHIPPED | OUTPATIENT
Start: 2025-03-26

## 2025-04-04 ENCOUNTER — TELEPHONE (OUTPATIENT)
Dept: FAMILY MEDICINE CLINIC | Facility: CLINIC | Age: 55
End: 2025-04-04
Payer: COMMERCIAL

## 2025-04-04 NOTE — TELEPHONE ENCOUNTER
Caller: Chava Gomez    Relationship to patient: Self      Best call back number: [phone]     Provider: LUCY HUYNH    Medication PA needed: PROTONIX    Reason for call/Prior Auth: INSURANCE AND PHARMACY,   [id number] [place], [place] - [address] [facility] [address] [facility] - [phone] [facility] [phone]  [phone]   NEED A PRIOR AUTH

## 2025-04-09 ENCOUNTER — TELEPHONE (OUTPATIENT)
Dept: FAMILY MEDICINE CLINIC | Facility: CLINIC | Age: 55
End: 2025-04-09
Payer: COMMERCIAL

## 2025-04-09 NOTE — TELEPHONE ENCOUNTER
Name: Chava Gomez    Relationship: Self    Best Callback Number: [phone]     HUB PROVIDED THE RELAY MESSAGE FROM THE OFFICE   PATIENT HAS FURTHER QUESTIONS AND WOULD LIKE A CALL BACK    ADDITIONAL INFORMATION:  PATIENT STATED THAT THE INSURANCE COMPANY IS NEEDING A PA STATING THAT PATIENT WILL NEED THIS MEDICATION MORE THAN ONCE A YEAR.   PATIENT HAS BEEN OUT OF THIS MEDICATION FOR 3 WEEKS. WHAT IS THE STATUS OF THE PRIOR AUTHORIZATION HAS IT BEEN SENT TO INSURANCE COMPANY YET? ALSO IS THERE ANY SAMPLES IN THE OFFICE?    PLEASE CALL

## 2025-04-10 NOTE — TELEPHONE ENCOUNTER
Returned patient's phone call. He states he has been out of his medication for about a month and needs a PA started on his Pantoprazole. Update to Maria M

## 2025-04-10 NOTE — TELEPHONE ENCOUNTER
Hub staff attempted to follow warm transfer process and was unsuccessful     Caller: Chava Gomez    Relationship to patient: Self    Best call back number: [phone]      Patient is needing: PT RETURNING PHONE CALL FOR MEDICATION. PLS CALL PT BACK.

## 2025-04-11 NOTE — TELEPHONE ENCOUNTER
PA submitted and approved: Pantoprazole Sodium 40MG dr tablets  (Key: [id number])    Outcome  Approved today by [facility] 2017  Your PA request has been approved. Additional information will be provided in the approval communication. (Message 1148)  Effective Date: 4/11/2025  Authorization Expiration Date: 12/31/2039    Pt notified and verbalized understanding.

## 2025-04-21 ENCOUNTER — OFFICE VISIT (OUTPATIENT)
Dept: FAMILY MEDICINE CLINIC | Facility: CLINIC | Age: 55
End: 2025-04-21
Payer: COMMERCIAL

## 2025-04-21 VITALS
TEMPERATURE: 97.8 F | OXYGEN SATURATION: 95 % | DIASTOLIC BLOOD PRESSURE: 90 MMHG | HEART RATE: 83 BPM | HEIGHT: 71 IN | WEIGHT: 246.5 LBS | SYSTOLIC BLOOD PRESSURE: 120 MMHG | BODY MASS INDEX: 34.51 KG/M2

## 2025-04-21 DIAGNOSIS — I10 PRIMARY HYPERTENSION: Primary | ICD-10-CM

## 2025-04-21 DIAGNOSIS — E78.2 MIXED HYPERLIPIDEMIA: Chronic | ICD-10-CM

## 2025-04-21 DIAGNOSIS — R73.09 IMPAIRED GLUCOSE METABOLISM: ICD-10-CM

## 2025-04-21 LAB
ALBUMIN SERPL-MCNC: 4.1 G/DL (ref 3.5–5.2)
ALBUMIN/GLOB SERPL: 1.3 G/DL
ALP SERPL-CCNC: 63 U/L (ref 39–117)
ALT SERPL-CCNC: 30 U/L (ref 1–41)
AST SERPL-CCNC: 20 U/L (ref 1–40)
BASOPHILS # BLD AUTO: 0.04 10*3/MM3 (ref 0–0.2)
BASOPHILS NFR BLD AUTO: 0.8 % (ref 0–1.5)
BILIRUB SERPL-MCNC: 0.5 MG/DL (ref 0–1.2)
BUN SERPL-MCNC: 16 MG/DL (ref 6–20)
BUN/CREAT SERPL: 14.2 (ref 7–25)
CALCIUM SERPL-MCNC: 9.5 MG/DL (ref 8.6–10.5)
CHLORIDE SERPL-SCNC: 105 MMOL/L (ref 98–107)
CHOLEST SERPL-MCNC: 229 MG/DL (ref 0–200)
CO2 SERPL-SCNC: 25.3 MMOL/L (ref 22–29)
CREAT SERPL-MCNC: 1.13 MG/DL (ref 0.76–1.27)
EGFRCR SERPLBLD CKD-EPI 2021: 77.2 ML/MIN/1.73
EOSINOPHIL # BLD AUTO: 0.09 10*3/MM3 (ref 0–0.4)
EOSINOPHIL NFR BLD AUTO: 1.7 % (ref 0.3–6.2)
ERYTHROCYTE [DISTWIDTH] IN BLOOD BY AUTOMATED COUNT: 14.1 % (ref 12.3–15.4)
GLOBULIN SER CALC-MCNC: 3.2 GM/DL
GLUCOSE SERPL-MCNC: 94 MG/DL (ref 65–99)
HBA1C MFR BLD: 6.1 % (ref 4.8–5.6)
HCT VFR BLD AUTO: 44.5 % (ref 37.5–51)
HDLC SERPL-MCNC: 39 MG/DL (ref 40–60)
HGB BLD-MCNC: 15.1 G/DL (ref 13–17.7)
IMM GRANULOCYTES # BLD AUTO: 0.01 10*3/MM3 (ref 0–0.05)
IMM GRANULOCYTES NFR BLD AUTO: 0.2 % (ref 0–0.5)
LDLC SERPL CALC-MCNC: 167 MG/DL (ref 0–100)
LYMPHOCYTES # BLD AUTO: 2.56 10*3/MM3 (ref 0.7–3.1)
LYMPHOCYTES NFR BLD AUTO: 49 % (ref 19.6–45.3)
MCH RBC QN AUTO: 30.6 PG (ref 26.6–33)
MCHC RBC AUTO-ENTMCNC: 33.9 G/DL (ref 31.5–35.7)
MCV RBC AUTO: 90.1 FL (ref 79–97)
MONOCYTES # BLD AUTO: 0.49 10*3/MM3 (ref 0.1–0.9)
MONOCYTES NFR BLD AUTO: 9.4 % (ref 5–12)
NEUTROPHILS # BLD AUTO: 2.03 10*3/MM3 (ref 1.7–7)
NEUTROPHILS NFR BLD AUTO: 38.9 % (ref 42.7–76)
NRBC BLD AUTO-RTO: 0 /100 WBC (ref 0–0.2)
PLATELET # BLD AUTO: 282 10*3/MM3 (ref 140–450)
POTASSIUM SERPL-SCNC: 4 MMOL/L (ref 3.5–5.2)
PROT SERPL-MCNC: 7.3 G/DL (ref 6–8.5)
RBC # BLD AUTO: 4.94 10*6/MM3 (ref 4.14–5.8)
SODIUM SERPL-SCNC: 140 MMOL/L (ref 136–145)
TRIGL SERPL-MCNC: 124 MG/DL (ref 0–150)
VLDLC SERPL CALC-MCNC: 23 MG/DL (ref 5–40)
WBC # BLD AUTO: 5.22 10*3/MM3 (ref 3.4–10.8)

## 2025-04-21 PROCEDURE — 99214 OFFICE O/P EST MOD 30 MIN: CPT | Performed by: NURSE PRACTITIONER

## 2025-04-21 NOTE — PROGRESS NOTES
"Chief Complaint  Hypertension    Subjective        HPI     Chava presents to [facility] for uncontrolled Hypertension:    Hypertension - he is on Amlodipine 10mg daily and Losartan 100mg daily.  He confirmed he has been taking both Amlodipine and Losartan daily since last visit but missed about 2 doses a couple weeks ago.   He denies having any complaints.    Hyperlipidemia - he is on Atorvastatin 40mg nightly.  He admits has not taken Atorvastatin nightly because he falls asleep at night before he is suppose to take it.        Objective   Vital Signs:   Vitals:    04/21/25 1050   BP: 120/90   BP Location: Left arm   Patient Position: Sitting   Cuff Size: Large Adult   Pulse: 83   Temp: 97.8 °F (36.6 °C)   TempSrc: Oral   SpO2: 95%   Weight: 112 kg (246 lb 8 oz)   Height: 180.3 cm (70.98\")        Physical Exam  Constitutional:       General: He is not in acute distress.     Appearance: Normal appearance.   HENT:      Head: Normocephalic and atraumatic.   Eyes:      Conjunctiva/sclera: Conjunctivae normal.   Cardiovascular:      Rate and Rhythm: Normal rate and regular rhythm.      Heart sounds: Normal heart sounds. No murmur heard.  Pulmonary:      Effort: Pulmonary effort is normal. No respiratory distress.      Breath sounds: Normal breath sounds. No wheezing.   Skin:     General: Skin is warm and dry.      Findings: No erythema.   Neurological:      Mental Status: He is alert.          Result Review :     The following data was reviewed by: AMANDA Hopper on 04/21/2025:      Comprehensive Metabolic Panel (11/26/2024 10:30)   Lipid Panel (11/26/2024 10:30)      Assessment and Plan    Assessment & Plan  Primary hypertension  Hypertension is controlled.  Decrease table salt and foods high in salt.  Weight loss.  Increase activity daily.  Continue Losartan 100mg daily and Amlodipine 10mg daily.  Blood pressure will be re-assessed in 6 months during Annual " Physical.    Orders:    CBC & Differential    Comprehensive Metabolic Panel     Mixed hyperlipidemia  Lipid abnormalities are uncontrolled on prior lab.  Taking Atorvastatin 40mg nightly but has not been consistent in taking it. Encouraged lifestyle changes.  Encouraged to take Atorvastatin nightly and he agreed.  Will re-assess lipids today.    Orders:    Comprehensive Metabolic Panel    Lipid Panel     Impaired glucose metabolism  Glucose blood level elevated on prior lab.  Will screen for DM today.    Orders:    Comprehensive Metabolic Panel    Hemoglobin A1c         Follow Up   Return in about 6 months (around 10/21/2025) for Annual physical.  Patient was given instructions and counseling regarding his condition or for health maintenance advice. Please see specific information pulled into the AVS if appropriate.

## 2025-04-21 NOTE — ASSESSMENT & PLAN NOTE
Lipid abnormalities are uncontrolled on prior lab.  Taking Atorvastatin 40mg nightly but has not been consistent in taking it. Encouraged lifestyle changes.  Encouraged to take Atorvastatin nightly and he agreed.  Will re-assess lipids today.    Orders:    Comprehensive Metabolic Panel    Lipid Panel

## 2025-04-21 NOTE — ASSESSMENT & PLAN NOTE
Hypertension is controlled.  Decrease table salt and foods high in salt.  Weight loss.  Increase activity daily.  Continue Losartan 100mg daily and Amlodipine 10mg daily.  Blood pressure will be re-assessed in 6 months during Annual Physical.    Orders:    CBC & Differential    Comprehensive Metabolic Panel

## 2025-04-23 DIAGNOSIS — I10 PRIMARY HYPERTENSION: Chronic | ICD-10-CM

## 2025-04-23 RX ORDER — LOSARTAN POTASSIUM 50 MG/1
50 TABLET ORAL DAILY
Qty: 90 TABLET | Refills: 0 | OUTPATIENT
Start: 2025-04-23

## 2025-06-17 DIAGNOSIS — I10 PRIMARY HYPERTENSION: Chronic | ICD-10-CM

## 2025-06-17 DIAGNOSIS — E78.2 MIXED HYPERLIPIDEMIA: Chronic | ICD-10-CM

## 2025-06-18 RX ORDER — ATORVASTATIN CALCIUM 40 MG/1
40 TABLET, FILM COATED ORAL NIGHTLY
Qty: 90 TABLET | Refills: 1 | Status: SHIPPED | OUTPATIENT
Start: 2025-06-18

## 2025-06-19 DIAGNOSIS — I10 PRIMARY HYPERTENSION: ICD-10-CM

## 2025-06-19 RX ORDER — LOSARTAN POTASSIUM 100 MG/1
100 TABLET ORAL DAILY
Qty: 90 TABLET | Refills: 1 | Status: SHIPPED | OUTPATIENT
Start: 2025-06-19

## (undated) DEVICE — SHEATH CATH MANOSHIELD ESOPH